# Patient Record
Sex: MALE | Race: BLACK OR AFRICAN AMERICAN | Employment: UNEMPLOYED | URBAN - METROPOLITAN AREA
[De-identification: names, ages, dates, MRNs, and addresses within clinical notes are randomized per-mention and may not be internally consistent; named-entity substitution may affect disease eponyms.]

---

## 2017-01-26 ENCOUNTER — OFFICE VISIT (OUTPATIENT)
Dept: ORTHOPEDIC SURGERY | Age: 62
End: 2017-01-26

## 2017-01-26 VITALS
BODY MASS INDEX: 28.99 KG/M2 | TEMPERATURE: 97.8 F | SYSTOLIC BLOOD PRESSURE: 131 MMHG | DIASTOLIC BLOOD PRESSURE: 76 MMHG | HEART RATE: 48 BPM | HEIGHT: 72 IN | WEIGHT: 214 LBS

## 2017-01-26 DIAGNOSIS — M76.72 TENDINITIS OF LEFT PERONEUS BREVIS TENDON: Primary | ICD-10-CM

## 2017-01-26 RX ORDER — CHOLECALCIFEROL (VITAMIN D3) 125 MCG
1 CAPSULE ORAL
COMMUNITY
End: 2022-09-02

## 2017-01-26 RX ORDER — MELOXICAM 15 MG/1
15 TABLET ORAL
Qty: 30 TAB | Refills: 1 | Status: SHIPPED | OUTPATIENT
Start: 2017-01-26 | End: 2022-03-14 | Stop reason: SINTOL

## 2017-01-26 NOTE — PATIENT INSTRUCTIONS
Please follow up as needed. You are advised to contact us if your condition worsens. Peroneal Tendon Strain: Rehab Exercises  Your Care Instructions  Here are some examples of typical rehabilitation exercises for your condition. Start each exercise slowly. Ease off the exercise if you start to have pain. Your doctor or physical therapist will tell you when you can start these exercises and which ones will work best for you. How to do the exercises  Calf wall stretch (back knee straight)    1. Stand facing a wall with your hands on the wall at about eye level. Put your affected leg about a step behind your other leg. 2. Keeping your back leg straight and your back heel on the floor, bend your front knee and gently bring your hip and chest toward the wall until you feel a stretch in the calf of your back leg. 3. Hold the stretch for at least 15 to 30 seconds. 4. Repeat 2 to 4 times. Calf wall stretch (knees bent)    1. Stand facing a wall with your hands on the wall at about eye level. Put your affected leg about a step behind your other leg. 2. Keeping both heels on the floor, bend both knees. Then gently bring your hip and chest toward the wall until you feel a stretch in the calf of your back leg. 3. Hold the stretch for at least 15 to 30 seconds. 4. Repeat 2 to 4 times. Hamstring wall stretch    1. Lie on your back in a doorway, with your good leg through the open door. 2. Slide your affected leg up the wall to straighten your knee. You should feel a gentle stretch down the back of your leg. ¨ Do not arch your back. ¨ Do not bend either knee. ¨ Keep one heel touching the floor and the other heel touching the wall. Do not point your toes. 3. Hold the stretch for at least 1 minute to begin. Then over time, try to lengthen the time you hold the stretch to as long as 6 minutes. 4. Repeat 2 to 4 times.   If you do not have a place to do this exercise in a doorway, there is another way to do it:  1. Lie on your back, and bend the knee of your affected leg. 2. Loop a towel under the ball and toes of that foot, and hold the ends of the towel in your hands. 3. Straighten your knee, and slowly pull back on the towel. You should feel a gentle stretch down the back of your leg. 4. Hold the stretch for 15 to 30 seconds. Or even better, hold the stretch for 1 minute if you can. 5. Repeat 2 to 4 times. Shin muscle stretch    1. Sit in a chair, with both feet flat on the floor. 2. Bend your affected leg behind you so that the top of your foot near your toes is flat on the floor and your toes are pointed away from your body. If you need to, you can hold on to the sides of the chair for support. 3. Hold the stretch for at least 15 to 30 seconds. You should feel a stretch in the front (shin) of your lower leg. 4. Repeat 2 to 4 times. Follow-up care is a key part of your treatment and safety. Be sure to make and go to all appointments, and call your doctor if you are having problems. It's also a good idea to know your test results and keep a list of the medicines you take. Where can you learn more? Go to http://amanda-brenda.info/. Enter [id number] in the search box to learn more about \"Peroneal Tendon Strain: Rehab Exercises. \"  Current as of: May 23, 2016  Content Version: 11.1  © 6276-8314 [employer]. Care instructions adapted under license by [employer] (which disclaims liability or warranty for this information). If you have questions about a medical condition or this instruction, always ask your healthcare professional. [address] any warranty or liability for your use of this information.

## 2017-01-26 NOTE — MR AVS SNAPSHOT
Visit Information Date & Time Provider Department Dept. Phone Encounter #  
 1/26/2017  2:50 PM Raudel Contreras, [address] [facility] [phone] Upcoming Health Maintenance Date Due Hepatitis C Screening 1955 DTaP/Tdap/Td series (1 - Tdap) 4/7/1978 FOBT Q 1 YEAR AGE 50-75 4/7/2007 INFLUENZA AGE 9 TO ADULT 8/1/2016 Allergies as of 1/26/2017  Review Complete On: 1/26/2017 By: Champ Padilla No Known Allergies Current Immunizations  Never Reviewed No immunizations on file. Not reviewed this visit You Were Diagnosed With   
  
 Codes Comments Tendinitis of left peroneus brevis tendon    -  Primary ICD-10-CM: M76.72 
ICD-9-CM: 727.06 Vitals BP Pulse Temp Height(growth percentile) Weight(growth percentile) BMI  
 131/76 (!) 48 97.8 °F (36.6 °C) (Oral) 6' (1.829 m) 214 lb (97.1 kg) 29.02 kg/m2 Smoking Status Never Smoker BMI and BSA Data Body Mass Index Body Surface Area  
 29.02 kg/m 2 2.22 m 2 Your Updated Medication List  
  
   
This list is accurate as of: 1/26/17  4:39 PM.  Always use your most recent med list.  
  
  
  
  
 ALEVE 220 mg Cap Generic drug:  naproxen sodium Take  by mouth. TYLENOL 325 mg tablet Generic drug:  acetaminophen Take  by mouth every four (4) hours as needed for Pain. Patient Instructions Please follow up as needed. You are advised to contact us if your condition worsens. Peroneal Tendon Strain: Rehab Exercises Your Care Instructions Here are some examples of typical rehabilitation exercises for your condition. Start each exercise slowly. Ease off the exercise if you start to have pain. Your doctor or physical therapist will tell you when you can start these exercises and which ones will work best for you. How to do the exercises Calf wall stretch (back knee straight) 1. Stand facing a wall with your hands on the wall at about eye level. Put your affected leg about a step behind your other leg. 2. Keeping your back leg straight and your back heel on the floor, bend your front knee and gently bring your hip and chest toward the wall until you feel a stretch in the calf of your back leg. 3. Hold the stretch for at least 15 to 30 seconds. 4. Repeat 2 to 4 times. Calf wall stretch (knees bent) 1. Stand facing a wall with your hands on the wall at about eye level. Put your affected leg about a step behind your other leg. 2. Keeping both heels on the floor, bend both knees. Then gently bring your hip and chest toward the wall until you feel a stretch in the calf of your back leg. 3. Hold the stretch for at least 15 to 30 seconds. 4. Repeat 2 to 4 times. Hamstring wall stretch 1. Lie on your back in a doorway, with your good leg through the open door. 2. Slide your affected leg up the wall to straighten your knee. You should feel a gentle stretch down the back of your leg. ¨ Do not arch your back. ¨ Do not bend either knee. ¨ Keep one heel touching the floor and the other heel touching the wall. Do not point your toes. 3. Hold the stretch for at least 1 minute to begin. Then over time, try to lengthen the time you hold the stretch to as long as 6 minutes. 4. Repeat 2 to 4 times. If you do not have a place to do this exercise in a doorway, there is another way to do it: 1. Lie on your back, and bend the knee of your affected leg. 2. Loop a towel under the ball and toes of that foot, and hold the ends of the towel in your hands. 3. Straighten your knee, and slowly pull back on the towel. You should feel a gentle stretch down the back of your leg. 4. Hold the stretch for 15 to 30 seconds. Or even better, hold the stretch for 1 minute if you can. 5. Repeat 2 to 4 times. Shin muscle stretch 1. Sit in a chair, with both feet flat on the floor. 2. Bend your affected leg behind you so that the top of your foot near your toes is flat on the floor and your toes are pointed away from your body. If you need to, you can hold on to the sides of the chair for support. 3. Hold the stretch for at least 15 to 30 seconds. You should feel a stretch in the front (shin) of your lower leg. 4. Repeat 2 to 4 times. Follow-up care is a key part of your treatment and safety. Be sure to make and go to all appointments, and call your doctor if you are having problems. It's also a good idea to know your test results and keep a list of the medicines you take. Where can you learn more? Go to http://amanda-brenda.info/. Enter [id number] in the search box to learn more about \"Peroneal Tendon Strain: Rehab Exercises. \" Current as of: May 23, 2016 Content Version: 11.1 © 9686-8716 [employer]. Care instructions adapted under license by [employer] (which disclaims liability or warranty for this information). If you have questions about a medical condition or this instruction, always ask your healthcare professional. [address] any warranty or liability for your use of this information. Introducing [facility]! Elyse Gabriel introduces [employer] patient portal. Now you can access parts of your medical record, email your doctor's office, and request medication refills online. 1. In your internet browser, go to [email or web address]. [email or web address] 2. Click on the First Time User? Click Here link in the Sign In box. You will see the New Member Sign Up page. 3. Enter your Gl[employer] Access Code exactly as it appears below. You will not need to use this code after youve completed the sign-up process. If you do not sign up before the expiration date, you must request a new code. · [employer] Access Code: [email or web address] Expires: 3/14/2017  1:08 PM 
 
 4. Enter the last four digits of your Social Security Number (xxxx) and Date of Birth (mm/dd/yyyy) as indicated and click Submit. You will be taken to the next sign-up page. 5. Create a [facility] ID. This will be your [facility] login ID and cannot be changed, so think of one that is secure and easy to remember. 6. Create a [employer] password. You can change your password at any time. 7. Enter your Password Reset Question and Answer. This can be used at a later time if you forget your password. 8. Enter your e-mail address. You will receive e-mail notification when new information is available in [address] 9. Click Sign Up. You can now view and download portions of your medical record. 10. Click the Download Summary menu link to download a portable copy of your medical information. If you have questions, please visit the Frequently Asked Questions section of the [employer] website. Remember, [facility] is NOT to be used for urgent needs. For medical emergencies, dial 911. Now available from your iPhone and Android! Please provide this summary of care documentation to your next provider. If you have any questions after today's visit, please call [phone].

## 2017-01-26 NOTE — PROGRESS NOTES
AMBULATORY PROGRESS NOTE      Patient: Vannessa Irving             MRN: [id number]     SSN: [id number] Body mass index is 29.02 kg/(m^2). YOB: 1955     AGE: 63 y.o. EX: male    PCP: No primary care provider on file. IMPRESSION/DIAGNOSIS AND TREATMENT PLAN     DIAGNOSES  1. Tendinitis of left peroneus brevis tendon        Orders Placed This Encounter    meloxicam (MOBIC) 15 mg tablet      Vannessa Irving understands his diagnoses and the proposed plan. Plan:    1) Continue HEP  2) Mobic 15 m PO tablet every day PRN; 30 tablets, 1 refill    RTO - PRN    HPI AND EXAMINATION     Vannessa Irving IS A 63 y.o. male who presents to my outpatient office for follow up of left peroneus brevis tendonitis. At last visit, I recommended HEP with Theraband, and I recommended the use of OTC NSAIDs when pain worsened. The patient presents to the office stating that he has improved. He states that he has been taking OTC Tylenol 300 mg when he has pain. He also reports he has been doing the HEP. Denies swelling, popping, and grinding during movement. Patient states that he works at [email or web address]. He states he works twelve hour shifts. Denies history of GI disorders. Denies taking blood thinners.     ANKLE/FOOT left     Psychiatry: Alert, Oriented x 3; Speech normal in context and clarity,    Memory intact grossly, no involuntary movements - tremors, no dementia  Gait: normal  Tenderness: no tenderness posterolateral aspect of ankle at the peroneal tendons  Cutaneous: WNL, no swelling to lateral aspect of ankle  Joint Motion: WNL  Joint / Tendon Stability: No Ankle or Subtalar instability or joint laxity. No peroneal sublux ability or dislocation  Alignment:  Normal Foot Alignment and  Flexible  Neuro Motor/Sensory: NL/NL  Vascular: NL foot/ankle pulses  Lymphatics: No extremity lymphedema, No calf swelling, no tenderness to calf muscles. CHART REVIEW     History reviewed.  No pertinent past medical history. Current Outpatient Prescriptions   Medication Sig    naproxen sodium (ALEVE) 220 mg cap Take  by mouth.  meloxicam (MOBIC) 15 mg tablet Take 1 Tab by mouth daily as needed for Pain.  acetaminophen (TYLENOL) 325 mg tablet Take  by mouth every four (4) hours as needed for Pain. No current facility-administered medications for this visit. No Known Allergies  History reviewed. No pertinent past surgical history. Social History     Occupational History    Not on file. Social History Main Topics    Smoking status: Never Smoker    Smokeless tobacco: Not on file    Alcohol use No    Drug use: No    Sexual activity: Not on file     History reviewed. No pertinent family history. REVIEW OF SYSTEMS : 1/26/2017  ALL BELOW ARE Negative except : SEE HPI       Constitutional: Negative for fever, chills and weight loss. Neg Weigh Loss  Cardiovascular: Negative for chest pain, claudication and leg swelling. SOB, MENESES   Gastrointestinal: Negative for  pain, N/V/D/C, Blood in stool or urine,dysuria, hematuria,        Incontinence, pelvic pain  Musculoskeletal: see HPI. Neurological: Negative for dizziness and weakness. Negative for headaches,Visual Changes, Confusion, Seizures,   Psychiatric/Behavioral: Negative for depression, memory loss and substance abuse. Extremities:  Negative for  hair changes, rash or skin lesion changes. Hematologic: Negative for Bleeding problems, bruising, pallor or swollen lymph nodes.   Peripheral Vascular: No calf pain, vascular vein tenderness to calf pain              No calf throbbing, posterior knee throbbing pain    DIAGNOSTIC IMAGING     No notes on file    Written by Shelley Elaine, as dictated by Monique Hassan MD.

## 2020-03-10 ENCOUNTER — OFFICE VISIT (OUTPATIENT)
Dept: ORTHOPEDIC SURGERY | Age: 65
End: 2020-03-10

## 2020-03-10 VITALS
TEMPERATURE: 97.4 F | RESPIRATION RATE: 16 BRPM | BODY MASS INDEX: 30.85 KG/M2 | HEIGHT: 72 IN | OXYGEN SATURATION: 96 % | SYSTOLIC BLOOD PRESSURE: 158 MMHG | WEIGHT: 227.8 LBS | DIASTOLIC BLOOD PRESSURE: 95 MMHG | HEART RATE: 51 BPM

## 2020-03-10 DIAGNOSIS — G89.29 CHRONIC PRIMARY MUSCULOSKELETAL PAIN: Primary | ICD-10-CM

## 2020-03-10 DIAGNOSIS — M54.59 MECHANICAL LOW BACK PAIN: ICD-10-CM

## 2020-03-10 DIAGNOSIS — M79.18 MYOFASCIAL PAIN: ICD-10-CM

## 2020-03-10 DIAGNOSIS — M54.50 LUMBAR SPINE PAIN: ICD-10-CM

## 2020-03-10 DIAGNOSIS — M79.18 CHRONIC PRIMARY MUSCULOSKELETAL PAIN: Primary | ICD-10-CM

## 2020-03-10 RX ORDER — AMITRIPTYLINE HYDROCHLORIDE 25 MG/1
25 TABLET, FILM COATED ORAL
COMMUNITY
Start: 2020-02-11

## 2020-03-10 RX ORDER — OMEPRAZOLE 10 MG/1
10 CAPSULE, DELAYED RELEASE ORAL DAILY
COMMUNITY

## 2020-03-10 RX ORDER — BACLOFEN 10 MG/1
TABLET ORAL 3 TIMES DAILY
COMMUNITY
Start: 2020-01-02 | End: 2022-03-14 | Stop reason: SINTOL

## 2020-03-10 RX ORDER — GABAPENTIN 300 MG/1
300 CAPSULE ORAL 3 TIMES DAILY
COMMUNITY
Start: 2020-01-02 | End: 2022-01-06 | Stop reason: DRUGHIGH

## 2020-03-10 NOTE — PROGRESS NOTES
[facility]  Office visit  Yuni Mclain  1934  [phone]    VISIT DATE:  12/16/2019      PCP: Markell Cho MD  [address] [place], [place] [id number]    CC:  Chief Complaint   Patient presents with   • Atrial contractions, premature   • Hypertension       PROBLEM LIST:  1. Premature atrial contractions and premature ventricular contractions:  a. Recent Zio monitoring confirming PACs, no arrhythmias, no pauses.  2. Hypercholesterolemia.  3. Benign hypertension.  4. Chest pain:  a. Mild to moderate coronary artery disease by catheterization, 2003, medically managed.  b. Recurrent episode since November using nitroglycerin x1.  5. Mild mitral regurgitation by echocardiography.      Previous cardiac studies and procedures:  July 2019 echo  · Estimated EF appears to be in the range of 66 - 70%.  · Left ventricular wall thickness is consistent with hypertrophy. Sigmoid-shaped ventricular septum is present.  · Left ventricular diastolic dysfunction (grade I a) consistent with impaired relaxation.    ASSESSMENT:   Diagnosis Plan   1. Atrial contractions, premature     2. Hypercholesterolemia     3. Benign hypertension         PLAN:  -Premature atrial contractions: Continue flecainide 50 mg by mouth twice a day    -Hypertension: Goal less than 140/90 mmHg, ideally less than 130/80 mmHg.  Currently with reasonable control.  Continue current medical therapy.    -Dyslipidemia: Continue niacin 500 mg by mouth daily.  Recent LDL of 160, goal less than 100, previously intolerant to multiple statins.  Continue dietary modifications in the setting of primary prevention.    Subjective  History of whitecoat hypertension.  Blood pressures predominantly running less than 140/90 mmHg.  She is compliant with medical therapy.  Intermittent but rare brief palpitations which she can sense at the base of her neck.  No associated symptoms.  No known triggers.   Compliant with medical therapy.   [address]  [place], [facility] [address]  Phone: [phone]  Fax: [phone]        [employer]  : 1955  PCP: Syeda Hope, NP  3/10/2020    NEW PATIENT      HISTORY OF PRESENT ILLNESS  Emerald Schafer is a 60 y.o. male c/o chronic back pain (R>L) since a work injury in 2018 with episodic pain radiating into the RLE. He was under the care of Dr. Zack Ferraro during this time. He is no longer covered under  for this injury. Pt notes that he last attended PT a few months ago with temporary relief. Pt reports difficulty getting out of bed, walking, or bending over to pick something up off the floor. He was seen by Sports Medicine who received injections and RFA in the cervical region that provided some relief. Pt notes that he also has a sore on his back from popping a blackhead. I advised he consult his PCP for this. Pt notes that he is trying to obtain disability as his pain is functionally disabling. He has been taking Gabapentin and muscle relaxants without significant benefit. Cervical spine MRI dated 3/14/19 reviewed. Per report, Minimal C4-5, C5-6, C6-7 central stenosis from minimal to mild disc bulging and spondylosis. Ventral cord contact with minimal flattening deformity, C3-4, C4-5, C5-C6 but no abnormal cord signal. Mid cervical straightening contributing to C3-4 level cord contact. Multilevel and bilateral foraminal stenosis, greatest and moderate to severe bilaterally at C4-5, C5-6, left C6-7 with impingement on bilateral C5, C6 and left C7 exiting nerve roots. Thoracic spine MRI dated 3/14/19 reviewed. Per report, Unremarkable MR thoracic spine. Incidental nonspinal findings as above. Lumbar spine MRI dated 10/26/18 reviewed. Per report, Multilevel spondylosis and foraminal narrowing without limiting central stenosis. L5/S1 and slight L3/4 annular tear which can be a pain generator.  Feathery hyperintensity which "Reviewed results of recent transthoracic echo    PHYSICAL EXAMINATION:  Vitals:    12/16/19 1317 12/16/19 1318   BP: 130/62 140/66   BP Location: Left arm Right arm   Patient Position: Sitting Sitting   Pulse: 66    Weight: 71.2 kg (157 lb)    Height: 167.6 cm (66\")      General Appearance:    Alert, cooperative, no distress, appears stated age   Head:    Normocephalic, without obvious abnormality, atraumatic   Eyes:    conjunctiva/corneas clear   Nose:   Nares normal, septum midline, mucosa normal, no drainage   Throat:   Lips, teeth and gums normal   Neck:   Supple, symmetrical, trachea midline, no carotid    bruit or JVD   Lungs:     Clear to auscultation bilaterally, respirations unlabored   Chest Wall:    No tenderness or deformity, I do not appreciate a bruit over the right upper chest.    Heart:    Regular rate and rhythm, S1 and S2 normal, 2/6 early peaking systolic murmur right upper sternal border, rub   or gallop, normal carotid impulse bilaterally without bruit.   Abdomen:     Soft, non-tender   Extremities:   Extremities normal, atraumatic, no cyanosis or edema   Pulses:   2+ and symmetric all extremities   Skin:   Skin color, texture, turgor normal, no rashes or lesions       Diagnostic Data:  Procedures  No results found for: CHLPL, TRIG, HDL, LDLDIRECT  Lab Results   Component Value Date    GLUCOSE 93 01/05/2017    BUN 10 01/05/2017    CREATININE 0.70 01/05/2017     01/05/2017    K 3.9 01/05/2017     01/05/2017    CO2 27.0 01/05/2017     Lab Results   Component Value Date    HGBA1C 4.8 07/05/2015     Lab Results   Component Value Date    WBC 6.78 01/05/2017    HGB 12.9 01/05/2017    HCT 38.6 01/05/2017     01/05/2017       Allergies  Allergies   Allergen Reactions   • Albuterol      Unknown reactions   • Statins Myalgia   • Demerol [Meperidine] Itching   • Sulfa Antibiotics Itching       Current Medications    Current Outpatient Medications:   •  amLODIPine (NORVASC) 5 MG tablet, " can reflect edema/myositis in the paraspinal back musculature. Heterogeneously hyperintense signal in distal cord (T11 level), at the edge of the field-of-view of imaging, typically artifactual and not confirmed on sagittal T1-weighted imaging. If clinical findings are suspicious for thoracic cord findings, then dedicated thoracic spine MRI could best further assess. He rates his pain as an 8/10 today. ASSESSMENT  His symptoms are likely due to chronic primary musculoskeletal pain. He had tenderness to palpation of the lumbar paraspinals (R>L), QL, and flank. He recently tried PT without success. He is neurologically intact. We discussed options of: updated MRI, pain management, PT    PLAN  1. Referral to pain management  2. Lumbar MRI to reevaluate annular tear, lumbar paraspinal edema. Pt will apply for [employer] Coverage. 3. We may consider a referral to PT depending on the results of his MRI. Pt will f/u after MRI or sooner if needed. Diagnoses and all orders for this visit:    1. Chronic primary musculoskeletal pain  -     REFERRAL TO PAIN MANAGEMENT  -     MRI LUMB SPINE WO CONT; Future    2. Lumbar spine pain  -     AMB POC XRAY, SPINE, LUMBOSACRAL; 2 O  -     REFERRAL TO PAIN MANAGEMENT  -     MRI LUMB SPINE WO CONT; Future    3. Mechanical low back pain  -     REFERRAL TO PAIN MANAGEMENT  -     MRI LUMB SPINE WO CONT; Future    4. Myofascial pain  -     REFERRAL TO PAIN MANAGEMENT  -     MRI LUMB SPINE WO CONT; Future       CHIEF COMPLAINT  Jennifer Julien is seen today in consultation at the request of Sandra Mahan NP for complaints of chronic low back pain. PAST MEDICAL HISTORY   No past medical history on file. History reviewed. No pertinent surgical history. MEDICATIONS    Current Outpatient Medications   Medication Sig Dispense Refill    naproxen sodium (ALEVE) 220 mg cap Take  by mouth.       meloxicam (MOBIC) 15 mg tablet Take 1 Tab by mouth daily as needed for Pain. 30 Tab 1    acetaminophen (TYLENOL) 325 mg tablet Take  by mouth every four (4) hours as needed for Pain. ALLERGIES  No Known Allergies       SOCIAL HISTORY    Social History     Socioeconomic History    Marital status:      Spouse name: Not on file    Number of children: Not on file    Years of education: Not on file    Highest education level: Not on file   Tobacco Use    Smoking status: Never Smoker   Substance and Sexual Activity    Alcohol use: No    Drug use: No       FAMILY HISTORY  No family history on file. REVIEW OF SYSTEMS  Review of Systems   Musculoskeletal: Positive for back pain. PHYSICAL EXAMINATION  Visit Vitals  BP (!) 158/95 (BP 1 Location: Right arm, BP Patient Position: Sitting)   Pulse (!) 51   Temp 97.4 °F (36.3 °C) (Oral)   Resp 16   Ht 6' (1.829 m)   Wt 227 lb 12.8 oz (103.3 kg)   SpO2 96%   BMI 30.90 kg/m²         Pain Assessment  3/10/2020   Location of Pain Back;Leg   Location Modifiers Inferior   Severity of Pain 8   Quality of Pain Aching   Quality of Pain Comment \"numbness,tingling,weakness\"   Duration of Pain Persistent   Frequency of Pain Constant   Aggravating Factors -   Limiting Behavior -   Relieving Factors -   Result of Injury No         Constitutional:  Well developed, well nourished, in no acute distress. Psychiatric: Affect and mood are appropriate. HEENT: Normocephalic, atraumatic. Extraocular movements intact. Integumentary: No rashes or abrasions noted on exposed areas. Cardiovascular: Regular rate and rhythm. Pulmonary: Clear to auscultation bilaterally. SPINE/MUSCULOSKELETAL EXAM    Cervical spine:  Neck is midline. Normal muscle tone. No focal atrophy is noted. ROM pain free. Shoulder ROM intact. No tenderness to palpation. Negative Spurling's sign. Negative Tinel's sign. Negative Russo's sign. Sensation in the bilateral arms grossly intact to light touch.      Lumbar spine:  No rash, Take 1 tablet by mouth Daily., Disp: 90 tablet, Rfl: 1  •  aspirin 81 MG EC tablet, Take 81 mg by mouth Daily., Disp: , Rfl:   •  CBD (cannabidiol) oral oil, Take 2 drops by mouth 4 (Four) Times a Day. 2 drops morning  2 drops nightly, Disp: , Rfl:   •  cholecalciferol (VITAMIN D3) 1000 units tablet, Take 5,000 Units by mouth Daily., Disp: , Rfl:   •  Famotidine (PEPCID PO), Take 25 mg by mouth As Needed., Disp: , Rfl:   •  flecainide (TAMBOCOR) 50 MG tablet, Take 1 tablet by mouth Every 12 (Twelve) Hours., Disp: 180 tablet, Rfl: 3  •  folic acid (FOLVITE) 400 MCG tablet, Take 400 mcg by mouth Daily., Disp: , Rfl:   •  hydrochlorothiazide (HYDRODIURIL) 25 MG tablet, Take 1 tablet by mouth Daily., Disp: 90 tablet, Rfl: 3  •  Ibuprofen (IBU PO), Take  by mouth As Needed., Disp: , Rfl:   •  niacin 500 MG tablet, Take 1 tablet by mouth Every Night., Disp: 90 tablet, Rfl: 3          ROS  Review of Systems   Cardiovascular: Positive for irregular heartbeat and palpitations. Negative for chest pain and dyspnea on exertion.   Respiratory: Positive for snoring. Negative for cough.    Genitourinary: Positive for frequency.       SOCIAL HX  Social History     Socioeconomic History   • Marital status:      Spouse name: Not on file   • Number of children: Not on file   • Years of education: Not on file   • Highest education level: Not on file   Tobacco Use   • Smoking status: Never Smoker   • Smokeless tobacco: Never Used   Substance and Sexual Activity   • Alcohol use: Yes     Comment: occasionally   • Drug use: No   • Sexual activity: Defer       FAMILY HX  Family History   Problem Relation Age of Onset   • Heart disease Mother    • Cancer Father    • Arthritis Father              Lewis Echols III, MD, FACC       ecchymosis, or gross obliquity. No fasciculations. No focal atrophy is noted. No pain with hip ROM. Full range of motion. Tenderness to palpation of lumbar paraspinals. No tenderness to palpation at the sciatic notch. SI joints non-tender. Trochanters non tender. Sensation in the bilateral legs grossly intact to light touch. Back pain reproduced with stabilization. MOTOR:      Biceps  Triceps Deltoids Wrist Ext Wrist Flex Hand Intrin   Right 5/5 5/5 5/5 5/5 5/5 5/5   Left 5/5 5/5 5/5 5/5 5/5 5/5             Hip Flex  Quads Hamstrings Ankle DF EHL Ankle PF   Right 5/5 5/5 5/5 5/5 5/5 5/5   Left 5/5 5/5 5/5 5/5 5/5 5/5     DTRs are 2+ biceps, triceps, brachioradialis, patella, and Achilles. Negative Straight Leg raise. Squat not tested. No difficulty with tandem gait. Ambulation without assistive device. FWB. RADIOGRAPHS  Lumbar XR images taken on 3/10/2020 personally reviewed with patient:  Spondylitic changes  Significant lean to the R  Disc space narrowing at L4-5 and L5-S1  Facet sclerosis  Potential Baastrup's disease  Normal alignment  No obvious compression deformities    Cervical MRI images taken on 3/14/19 personally reviewed with patient:  Mild mid cervical straightening with loss of upper cervical lordosis but no listhesis    Postop:   None     At C2-3:  Unremarkable disc and facet joints. At C3-4:  Minimal loss of disc height with mild disc bulge and minimal spondylosis. Low normal canal caliber; no central stenosis. Contact and minimal flattening of the ventral cord due to alignment versus cord difference but no abnormal cord signal. Unremarkable facet joints. No foraminal stenosis      At C4-5:  Mild loss of disc height with mild disc bulge and minimal spondylosis. Minimal central stenosis. Contact and minimal flattening of the ventral cord in part due to alignment versus cord difference but no abnormal cord signal. Unremarkable facet joints.  Severe left and moderate right foraminal stenosis from uncovertebral bony hypertrophy with impingement on both exiting C5 nerve roots. At C5-6:  Mild loss of disc height with mild disc bulge and minimal spondylosis. Minimal central stenosis. Contact and minimal flattening of the ventral cord in part due to alignment versus cord difference but no abnormal cord signal. Unremarkable facet joints. Severe left and right foraminal stenosis from uncovertebral bony hypertrophy with impingement on both exiting C6 nerve roots. At C6-7:  Mild loss of disc height with mild minimally asymmetric left greater than right paracentral disc bulge and minimal spondylosis. Minimal central stenosis. Contact and minimal flattening of the ventral cord in part due to alignment versus cord difference but no abnormal cord signal. Unremarkable facet joints. Severe left and mild right foraminal stenosis from uncovertebral bony hypertrophy with impingement on left exiting C7 nerve root. At C7-T1:  Unremarkable disc and facet joints. Unremarkable upper thoracic discs as included    CERVICAL CORD:  Cervical cord is of otherwise normal caliber and signal.     MISC:  Marrow signal is primarily red marrow, unremarkable for age and gender. Craniocervical junction is within normal limits. Atlantoaxial joint is minimally osteoarthritic    Paraspinal musculature is unremarkable in caliber and signal.     L5-S1 spondylosis on  localizer,      IMPRESSION    1. Minimal C4-5, C5-6, C6-7 central stenosis from minimal to mild disc bulging and spondylosis. Ventral cord contact with minimal flattening deformity, C3-4, C4-5, C5-C6 but no abnormal cord signal. Mid cervical straightening contributing to C3-4 level cord contact. 2. Multilevel and bilateral foraminal stenosis, greatest and moderate to severe bilaterally at C4-5, C5-6, left C6-7 with impingement on bilateral C5, C6 and left C7 exiting nerve roots.     Thoracic MRI images taken on 3/14/19 personally reviewed with patient:  Alignment is anatomic. DISC LEVELS:  Unremarkable discs and facet joints. Minimal T11-12 foraminal stenosis from facet arthritis. THORACIC CORD:  Thoracic cord is of normal caliber and signal. Top normal caliber epidural fat but no canal stenosis    MISC:  Focal round 1 cm hemangioma, T2 vertebral body Marrow signal is otherwise unremarkable for age and gender. Paraspinal musculature is unremarkable in caliber and signal.     Probable small 2 cm round fluid signal lesion in the right lobe liver and 1 cm round fluid signal lesion in the right kidney, both incompletely included visible on only 2 images, indeterminate but most likely benign most likely small cysts, possibly hepatic hemangioma and renal cyst. Curvilinear intermediate signal both lungs, indeterminate also most likely partial atelectasis or scarring. IMPRESSION    1. Unremarkable MR thoracic spine    2. Incidental nonspinal findings as above. Lumbar MRI images taken on 10/26/18 personally reviewed with patient:  The conus terminates at the L1 level, without contour abnormalities. There is heterogeneous hyperintense signal in the distal cord, on T2 and STIR imaging, not confirmed on T1. Axial imaging is not obtained through this level. Marrow/osseous: No T1 or STIR signal abnormalities to suggest acute facet, marrow, edema or focal lesion. T12/L1: No significant disc protrusion or limiting stenosis is seen. L1/2: No significant disc protrusion or limiting stenosis is seen. L2/3: Mild disc protrusion, mild ventral impression on thecal sac without limiting central or foraminal stenosis. Mild facet arthropathy. L3/4: Mild disc protrusion, mild ventral impression on thecal sac, facet and ligamentous hypertrophy mildly flattening the dorsal lateral thecal sac. Slight T2 hyperintensity suggests annular tear. Mild to moderate left more than right inferior foraminal narrowing.  Borderline central stenosis. L4/5: Disc space narrowing, mild to moderate inferior foraminal narrowing secondary to disc/osteophyte, facet and ligamentous hypertrophy. Mild dorsolateral impression on the thecal sac. No limiting central stenosis. L5/S1: Central and foraminal disc/osteophyte, annular tear, mild ventral contact of the thecal sac and descending S1 roots, and right more than left inferior foraminal narrowing, slightly contact the undersurface of the foraminal right L5 nerve root. Facet and ligamentous hypertrophy. Other: Feathery hyperintensity in the posterior paraspinal back musculature. IMPRESSION      1. Multilevel spondylosis and foraminal narrowing without limiting central stenosis. L5/S1 and slight L3/4 annular tear which can be a pain generator. 2. Feathery hyperintensity which can reflect edema/myositis in the paraspinal back musculature. 3. Heterogeneously hyperintense signal in distal cord (T11 level), at the edge of the field-of-view of imaging, typically artifactual and not confirmed on sagittal T1-weighted imaging. If clinical findings are suspicious for thoracic cord findings, then dedicated thoracic spine MRI could best further assess.  reviewed    Mr. Geovanna Armstrong has a reminder for a \"due or due soon\" health maintenance. I have asked that he contact his primary care provider for follow-up on this health maintenance. 45 minutes of face-to-face contact were spent with the patient during today's visit extensively discussing symptoms and treatment plan. All questions were answered. More than half of this visit today was spent on counseling. Written by Faith Cabrera, as dictated by Dr. Homero Molina. I, Dr. Homero Molina, confirm that all documentation is accurate.

## 2021-08-26 ENCOUNTER — OFFICE VISIT (OUTPATIENT)
Dept: ORTHOPEDIC SURGERY | Age: 66
End: 2021-08-26
Payer: MEDICARE

## 2021-08-26 VITALS
BODY MASS INDEX: 29.58 KG/M2 | WEIGHT: 218.4 LBS | TEMPERATURE: 97.4 F | OXYGEN SATURATION: 97 % | HEIGHT: 72 IN | RESPIRATION RATE: 18 BRPM | HEART RATE: 60 BPM

## 2021-08-26 DIAGNOSIS — M54.16 LUMBAR RADICULOPATHY: ICD-10-CM

## 2021-08-26 DIAGNOSIS — M79.18 CHRONIC PRIMARY MUSCULOSKELETAL PAIN: Primary | ICD-10-CM

## 2021-08-26 DIAGNOSIS — M54.59 MECHANICAL LOW BACK PAIN: ICD-10-CM

## 2021-08-26 DIAGNOSIS — M79.18 MYOFASCIAL PAIN: ICD-10-CM

## 2021-08-26 DIAGNOSIS — G89.29 CHRONIC PRIMARY MUSCULOSKELETAL PAIN: Primary | ICD-10-CM

## 2021-08-26 PROCEDURE — 99213 OFFICE O/P EST LOW 20 MIN: CPT | Performed by: PHYSICAL MEDICINE & REHABILITATION

## 2021-08-26 RX ORDER — PREDNISONE 10 MG/1
TABLET ORAL
Qty: 21 TABLET | Refills: 0 | Status: SHIPPED | OUTPATIENT
Start: 2021-08-26 | End: 2022-03-14 | Stop reason: ALTCHOICE

## 2021-08-26 NOTE — PROGRESS NOTES
[address], [address]  [place]  Phone: [phone]  Fax: [phone]        Gregorio Sorto  : 1955  PCP: Tito Aparicio MD  2021    PROGRESS NOTE      HISTORY OF PRESENT ILLNESS  Marleen Richard is a 61 y.o. male who was seen as a new patient 3/10/2020 with c/o chronic back pain (R>L) since a work injury in 2018 with episodic pain radiating into the RLE. He was under the care of Dr. Mick Ontiveros during this time. He is no longer covered under  for this injury. Pt notes that he last attended PT a few months ago with temporary relief. Pt reports difficulty getting out of bed, walking, or bending over to pick something up off the floor. He was seen by Sports Medicine who received injections and RFA in the cervical region that provided some relief. Pt notes that he also has a sore on his back from popping a blackhead. I advised he consult his PCP for this. Pt notes that he is trying to obtain disability as his pain is functionally disabling. He has been taking Gabapentin and muscle relaxants without significant benefit. Cervical spine MRI dated 3/14/19 reviewed. Per report, Minimal C4-5, C5-6, C6-7 central stenosis from minimal to mild disc bulging and spondylosis. Ventral cord contact with minimal flattening deformity, C3-4, C4-5, C5-C6 but no abnormal cord signal. Mid cervical straightening contributing to C3-4 level cord contact. Multilevel and bilateral foraminal stenosis, greatest and moderate to severe bilaterally at C4-5, C5-6, left C6-7 with impingement on bilateral C5, C6 and left C7 exiting nerve roots. Thoracic spine MRI dated 3/14/19 reviewed. Per report, Unremarkable MR thoracic spine. Incidental nonspinal findings as above. Lumbar spine MRI dated 10/26/18 reviewed. Per report, Multilevel spondylosis and foraminal narrowing without limiting central stenosis.  L5/S1 and slight L3/4 annular tear which can be a pain generator. Feathery hyperintensity which can reflect edema/myositis in the paraspinal back musculature. Heterogeneously hyperintense signal in distal cord (T11 level), at the edge of the field-of-view of imaging, typically artifactual and not confirmed on sagittal T1-weighted imaging. If clinical findings are suspicious for thoracic cord findings, then dedicated thoracic spine MRI could best further assess. Jerod Price comes in to the office today for f/u. Pt notes that he continues to have low back pain radiating into the posterior LLE to the calf that is worse with sitting, especially riding in the car. He has pain with transitional movements. He has not had any treatment since his last OV because he just received insurance coverage. He has been maintaining a consistent HEP. He occasionally takes Ibuprofen. He has been taking Baclofen, but his wife notes it seems to increase his blood pressure. Pain Score: 7/10. Treatments patient has tried:  Physical therapy:No  Doing HEP: Yes  Non-opioid medications: Yes- Ibuprofen (PRN), baclofen  Spinal injections: No  Spinal surgery- No.   Last Lumbar MRI: 2018    PmHx: acid reflux    ASSESSMENT  Jerod Price is a 61year-old male with chronic low back pain radiating into the LLE. His symptoms are likely due to myofascial pain and a potential left-sided radiculopathy. He is neurologically intact. PLAN  1. 10 mg Prednisone dose pack - he should consult with his dentist first (potential dental infection)  2. Referral to PT [facility])  3. I advised he can take Tylenol with his Ibuprofen. Pt will f/u in 6-8 weeks or sooner as needed. Diagnoses and all orders for this visit:    1. Chronic primary musculoskeletal pain  -     predniSONE (STERAPRED DS) 10 mg dose pack; See administration instruction per 10mg dose pack  -     REFERRAL TO PHYSICAL THERAPY    2. Mechanical low back pain  -     predniSONE (STERAPRED DS) 10 mg dose pack;  See administration instruction per 10mg dose pack  -     REFERRAL TO PHYSICAL THERAPY    3. Myofascial pain  -     predniSONE (STERAPRED DS) 10 mg dose pack; See administration instruction per 10mg dose pack  -     REFERRAL TO PHYSICAL THERAPY    4. Lumbar radiculopathy  -     predniSONE (STERAPRED DS) 10 mg dose pack; See administration instruction per 10mg dose pack  -     REFERRAL TO PHYSICAL THERAPY         PAST MEDICAL HISTORY   History reviewed. No pertinent past medical history. History reviewed. No pertinent surgical history. Leeanne Nguyen MEDICATIONS      Current Outpatient Medications   Medication Sig Dispense Refill    amitriptyline (ELAVIL) 25 mg tablet nightly.  gabapentin (NEURONTIN) 300 mg capsule 300 mg three (3) times daily.  baclofen (LIORESAL) 10 mg tablet three (3) times daily.  omeprazole (PRILOSEC) 10 mg capsule Take 10 mg by mouth daily.  acetaminophen (TYLENOL) 325 mg tablet Take 800 mg by mouth three (3) times daily.  naproxen sodium (ALEVE) 220 mg cap Take  by mouth. (Patient not taking: Reported on 8/26/2021)      meloxicam (MOBIC) 15 mg tablet Take 1 Tab by mouth daily as needed for Pain. (Patient not taking: Reported on 3/10/2020) 30 Tab 1        ALLERGIES  No Known Allergies       SOCIAL HISTORY    Social History     Socioeconomic History    Marital status:      Spouse name: Not on file    Number of children: Not on file    Years of education: Not on file    Highest education level: Not on file   Tobacco Use    Smoking status: Never Smoker    Smokeless tobacco: Never Used   Substance and Sexual Activity    Alcohol use: No    Drug use: No     Social Determinants of Health     Financial Resource Strain:     Difficulty of Paying Living Expenses:    Food Insecurity:     Worried About Running Out of Food in the Last Year:     [address] in the Last Year:    Transportation Needs:     Lack of Transportation (Medical):      Lack of Transportation (Non-Medical):    Physical Activity:     Days of Exercise per Week:     Minutes of Exercise per Session:    Stress:     Feeling of Stress :    Social Connections:     Frequency of Communication with Friends and Family:     Frequency of Social Gatherings with Friends and Family:     Attends Gnosticism Services:     Active Member of Clubs or Organizations:     Attends Club or Organization Meetings:     Marital Status:        FAMILY HISTORY  No family history on file. REVIEW OF SYSTEMS  Review of Systems   Constitutional: Negative for chills, fever and weight loss. Respiratory: Negative for shortness of breath. Cardiovascular: Negative for chest pain. Gastrointestinal: Negative for constipation. Negative for fecal incontinence    Genitourinary: Negative for dysuria. Negative for urinary incontinence   Musculoskeletal: Positive for back pain. LLE pain   Skin: Negative for rash. Neurological: Negative for dizziness, tingling, tremors, focal weakness and headaches. Endo/Heme/Allergies: Does not bruise/bleed easily. Psychiatric/Behavioral: The patient does not have insomnia. PHYSICAL EXAMINATION  Visit Vitals  Pulse 60   Temp 97.4 °F (36.3 °C) (Temporal)   Resp 18   Ht 6' (1.829 m)   Wt 218 lb 6.4 oz (99.1 kg)   SpO2 97%   BMI 29.62 kg/m²       Pain Assessment  8/26/2021   Location of Pain Back;Leg   Location Modifiers Left;Right   Severity of Pain 7   Quality of Pain Other (Comment); Sharp   Quality of Pain Comment -   Duration of Pain Persistent   Frequency of Pain Constant   Aggravating Factors Walking   Limiting Behavior Some   Relieving Factors Rest   Result of Injury -           Constitutional:  Well developed, well nourished, in no acute distress. Psychiatric: Affect and mood are appropriate. Integumentary: No rashes or abrasions noted on exposed areas. SPINE/MUSCULOSKELETAL EXAM    Cervical spine:  Neck is midline. Normal muscle tone.    No focal atrophy is noted. ROM pain free. Shoulder ROM intact. No tenderness to palpation. Negative Spurling's sign. Negative Tinel's sign. Negative Russo's sign. Sensation in the bilateral arms grossly intact to light touch.      Lumbar spine:  No rash, ecchymosis, or gross obliquity. No fasciculations. No focal atrophy is noted. No pain with hip ROM. Full range of motion. Tenderness to palpation of lumbar paraspinals. No tenderness to palpation at the sciatic notch. SI joints non-tender. Trochanters non tender. Sensation in the bilateral legs grossly intact to light touch.     Back pain reproduced with stabilization. Updates 8/26/21:  Negative SLR bilaterally  Intact sensation  Pain with transitional movements  Ambulation with walking stick  Tenderness to palpation lumbar paraspinals      MOTOR:      Biceps  Triceps Deltoids Wrist Ext Wrist Flex Hand Intrin   Right 5/5 5/5 5/5 5/5 5/5 5/5   Left 5/5 5/5 5/5 5/5 5/5 5/5             Hip Flex  Quads Hamstrings Ankle DF EHL Ankle PF   Right 5/5 5/5 5/5 5/5 5/5 5/5   Left 5/5 5/5 5/5 5/5 5/5 5/5     DTRs are 2+ biceps, triceps, brachioradialis, patella, and Achilles.     Negative Straight Leg raise. Squat not tested. No difficulty with tandem gait.      Ambulation without assistive device. FWB.       RADIOGRAPHS  Lumbar XR images taken on 3/10/2020 personally reviewed with patient:  Spondylitic changes  Significant lean to the R  Disc space narrowing at L4-5 and L5-S1  Facet sclerosis  Potential Baastrup's disease  Normal alignment  No obvious compression deformities    Cervical MRI images taken on 3/14/19 personally reviewed with patient:  Mild mid cervical straightening with loss of upper cervical lordosis but no listhesis    Postop:   None     At C2-3:  Unremarkable disc and facet joints.       At C3-4:  Minimal loss of disc height with mild disc bulge and minimal spondylosis. Low normal canal caliber; no central stenosis. Contact and minimal flattening of the ventral cord due to alignment versus cord difference but no abnormal cord signal. Unremarkable facet joints. No foraminal stenosis      At C4-5:  Mild loss of disc height with mild disc bulge and minimal spondylosis. Minimal central stenosis. Contact and minimal flattening of the ventral cord in part due to alignment versus cord difference but no abnormal cord signal. Unremarkable facet joints. Severe left and moderate right foraminal stenosis from uncovertebral bony hypertrophy with impingement on both exiting C5 nerve roots. At C5-6:  Mild loss of disc height with mild disc bulge and minimal spondylosis. Minimal central stenosis. Contact and minimal flattening of the ventral cord in part due to alignment versus cord difference but no abnormal cord signal. Unremarkable facet joints. Severe left and right foraminal stenosis from uncovertebral bony hypertrophy with impingement on both exiting C6 nerve roots. At C6-7:  Mild loss of disc height with mild minimally asymmetric left greater than right paracentral disc bulge and minimal spondylosis. Minimal central stenosis. Contact and minimal flattening of the ventral cord in part due to alignment versus cord difference but no abnormal cord signal. Unremarkable facet joints. Severe left and mild right foraminal stenosis from uncovertebral bony hypertrophy with impingement on left exiting C7 nerve root. At C7-T1:  Unremarkable disc and facet joints. Unremarkable upper thoracic discs as included    CERVICAL CORD:  Cervical cord is of otherwise normal caliber and signal.     MISC:  Marrow signal is primarily red marrow, unremarkable for age and gender. Craniocervical junction is within normal limits.      Atlantoaxial joint is minimally osteoarthritic    Paraspinal musculature is unremarkable in caliber and signal. L5-S1 spondylosis on  localizer,      IMPRESSION    1. Minimal C4-5, C5-6, C6-7 central stenosis from minimal to mild disc bulging and spondylosis. Ventral cord contact with minimal flattening deformity, C3-4, C4-5, C5-C6 but no abnormal cord signal. Mid cervical straightening contributing to C3-4 level cord contact. 2. Multilevel and bilateral foraminal stenosis, greatest and moderate to severe bilaterally at C4-5, C5-6, left C6-7 with impingement on bilateral C5, C6 and left C7 exiting nerve roots.     Thoracic MRI images taken on 3/14/19 personally reviewed with patient:  Alignment is anatomic. DISC LEVELS:  Unremarkable discs and facet joints. Minimal T11-12 foraminal stenosis from facet arthritis. THORACIC CORD:  Thoracic cord is of normal caliber and signal. Top normal caliber epidural fat but no canal stenosis    MISC:  Focal round 1 cm hemangioma, T2 vertebral body Marrow signal is otherwise unremarkable for age and gender. Paraspinal musculature is unremarkable in caliber and signal.     Probable small 2 cm round fluid signal lesion in the right lobe liver and 1 cm round fluid signal lesion in the right kidney, both incompletely included visible on only 2 images, indeterminate but most likely benign most likely small cysts, possibly hepatic hemangioma and renal cyst. Curvilinear intermediate signal both lungs, indeterminate also most likely partial atelectasis or scarring. IMPRESSION    1. Unremarkable MR thoracic spine    2. Incidental nonspinal findings as above.     Lumbar MRI images taken on 10/26/18 personally reviewed with patient:  The conus terminates at the L1 level, without contour abnormalities. There is heterogeneous hyperintense signal in the distal cord, on T2 and STIR imaging, not confirmed on T1. Axial imaging is not obtained through this level. Marrow/osseous: No T1 or STIR signal abnormalities to suggest acute facet, marrow, edema or focal lesion. T12/L1: No significant disc protrusion or limiting stenosis is seen. L1/2: No significant disc protrusion or limiting stenosis is seen. L2/3: Mild disc protrusion, mild ventral impression on thecal sac without limiting central or foraminal stenosis. Mild facet arthropathy. L3/4: Mild disc protrusion, mild ventral impression on thecal sac, facet and ligamentous hypertrophy mildly flattening the dorsal lateral thecal sac. Slight T2 hyperintensity suggests annular tear. Mild to moderate left more than right inferior foraminal narrowing. Borderline central stenosis. L4/5: Disc space narrowing, mild to moderate inferior foraminal narrowing secondary to disc/osteophyte, facet and ligamentous hypertrophy. Mild dorsolateral impression on the thecal sac. No limiting central stenosis. L5/S1: Central and foraminal disc/osteophyte, annular tear, mild ventral contact of the thecal sac and descending S1 roots, and right more than left inferior foraminal narrowing, slightly contact the undersurface of the foraminal right L5 nerve root. Facet and ligamentous hypertrophy. Other: Feathery hyperintensity in the posterior paraspinal back musculature. IMPRESSION      1. Multilevel spondylosis and foraminal narrowing without limiting central stenosis. L5/S1 and slight L3/4 annular tear which can be a pain generator. 2. Feathery hyperintensity which can reflect edema/myositis in the paraspinal back musculature. 3. Heterogeneously hyperintense signal in distal cord (T11 level), at the edge of the field-of-view of imaging, typically artifactual and not confirmed on sagittal T1-weighted imaging. If clinical findings are suspicious for thoracic cord findings, then dedicated thoracic spine MRI could best further assess. 15 minutes of face-to-face contact were spent with the patient during today's visit extensively discussing symptoms and treatment plan. All questions were answered.  More than half of this visit today was spent on counseling.      Written by Tasha Arroyo as dictated by Mariel Carbone MD

## 2021-08-26 NOTE — PATIENT INSTRUCTIONS

## 2021-11-18 ENCOUNTER — OFFICE VISIT (OUTPATIENT)
Dept: ORTHOPEDIC SURGERY | Age: 66
End: 2021-11-18
Payer: MEDICARE

## 2021-11-18 VITALS
WEIGHT: 211 LBS | RESPIRATION RATE: 16 BRPM | DIASTOLIC BLOOD PRESSURE: 81 MMHG | TEMPERATURE: 97.2 F | HEIGHT: 72 IN | HEART RATE: 72 BPM | OXYGEN SATURATION: 98 % | SYSTOLIC BLOOD PRESSURE: 124 MMHG | BODY MASS INDEX: 28.58 KG/M2

## 2021-11-18 DIAGNOSIS — G89.29 CHRONIC PRIMARY MUSCULOSKELETAL PAIN: Primary | ICD-10-CM

## 2021-11-18 DIAGNOSIS — M54.16 LUMBAR RADICULOPATHY: ICD-10-CM

## 2021-11-18 DIAGNOSIS — M79.18 CHRONIC PRIMARY MUSCULOSKELETAL PAIN: Primary | ICD-10-CM

## 2021-11-18 DIAGNOSIS — M79.18 MYOFASCIAL PAIN: ICD-10-CM

## 2021-11-18 DIAGNOSIS — M54.59 MECHANICAL LOW BACK PAIN: ICD-10-CM

## 2021-11-18 PROCEDURE — G8427 DOCREV CUR MEDS BY ELIG CLIN: HCPCS | Performed by: PHYSICAL MEDICINE & REHABILITATION

## 2021-11-18 PROCEDURE — G8510 SCR DEP NEG, NO PLAN REQD: HCPCS | Performed by: PHYSICAL MEDICINE & REHABILITATION

## 2021-11-18 PROCEDURE — 99213 OFFICE O/P EST LOW 20 MIN: CPT | Performed by: PHYSICAL MEDICINE & REHABILITATION

## 2021-11-18 PROCEDURE — 3017F COLORECTAL CA SCREEN DOC REV: CPT | Performed by: PHYSICAL MEDICINE & REHABILITATION

## 2021-11-18 PROCEDURE — G8419 CALC BMI OUT NRM PARAM NOF/U: HCPCS | Performed by: PHYSICAL MEDICINE & REHABILITATION

## 2021-11-18 RX ORDER — TIZANIDINE 2 MG/1
4 TABLET ORAL
COMMUNITY
Start: 2021-10-18

## 2021-11-18 NOTE — PROGRESS NOTES
[address]  [address], [address]  [place], [address] [place] [address] Street  Phone: [phone]  Fax: [phone]        Coni Ashraf  : 1955  PCP: Micheal Berrios MD  2021    PROGRESS NOTE      HISTORY OF PRESENT ILLNESS  Miguel Kaplan is a 61 y.o. male who was seen as a new patient 3/10/2020 with c/o chronic back pain (R>L) since a work injury in 2018 with episodic pain radiating into the RLE. He was under the care of Dr. Bella Pritchard during this time. He is no longer covered under  for this injury. Pt notes that he last attended PT a few months ago with temporary relief. Pt reports difficulty getting out of bed, walking, or bending over to pick something up off the floor. He was seen by Sports Medicine who received injections and RFA in the cervical region that provided some relief. Pt notes that he also has a sore on his back from popping a blackhead. I advised he consult his PCP for this. Pt notes that he is trying to obtain disability as his pain is functionally disabling. He has been taking Gabapentin and muscle relaxants without significant benefit. Cervical spine MRI dated 3/14/19 reviewed. Per report, Minimal C4-5, C5-6, C6-7 central stenosis from minimal to mild disc bulging and spondylosis. Ventral cord contact with minimal flattening deformity, C3-4, C4-5, C5-C6 but no abnormal cord signal. Mid cervical straightening contributing to C3-4 level cord contact. Multilevel and bilateral foraminal stenosis, greatest and moderate to severe bilaterally at C4-5, C5-6, left C6-7 with impingement on bilateral C5, C6 and left C7 exiting nerve roots. Thoracic spine MRI dated 3/14/19 reviewed. Per report, Unremarkable MR thoracic spine. Incidental nonspinal findings as above. Lumbar spine MRI dated 10/26/18 reviewed. Per report, Multilevel spondylosis and foraminal narrowing without limiting central stenosis.  L5/S1 and slight L3/4 annular tear which can be a pain generator. Feathery hyperintensity which can reflect edema/myositis in the paraspinal back musculature. Heterogeneously hyperintense signal in distal cord (T11 level), at the edge of the field-of-view of imaging, typically artifactual and not confirmed on sagittal T1-weighted imaging. If clinical findings are suspicious for thoracic cord findings, then dedicated thoracic spine MRI could best further assess. He continued to have low back pain radiating into the posterior LLE to the calf that was worse with sitting, especially riding in the car. He had pain with transitional movements. He has not had any treatment since his last OV because he just received insurance coverage. He has been maintaining a consistent HEP. He occasionally takes Ibuprofen. He has been taking Baclofen, but his wife notes it seems to increase his blood pressure. Gretchen Reyes comes in to the office today for f/u. He went to [address] with about 50% improvement. He notes that his back is weak, but his pain tends to be episodic. It tends to be worse at the end of the day. He has some pain in the morning until he gets going. He found some benefit with Tizanidine prescribed by another provider. Pain Score: 4/10. Treatments patient has tried:  Physical therapy: Yes  Doing HEP: Yes  Non-opioid medications: Yes- Ibuprofen (PRN), baclofen  Spinal injections: No  Spinal surgery- No.   Last Lumbar MRI: 2018     PmHx: acid reflux    ASSESSMENT  Gretchen Reyes is a 61 y.o. male with c/o chronic low back pain radiating into the LLE. His symptoms are likely due to myofascial pain and a potential left-sided radiculopathy. He is neurologically intact. He has responded well to PT. PLAN  1. Lumbar MRI - low back pain radiating into LLE despite>6 weeks conservative tx including PT; evaluate lumbar radiculopathy  2. Continue with HEP. Pt will f/u after MRI or sooner as needed.       Diagnoses and all orders for this visit: 1. Chronic primary musculoskeletal pain    2. Mechanical low back pain    3. Myofascial pain    4. Lumbar radiculopathy         PAST MEDICAL HISTORY   History reviewed. No pertinent past medical history. History reviewed. No pertinent surgical history. Morgan Hackett MEDICATIONS      Current Outpatient Medications   Medication Sig Dispense Refill    tiZANidine (ZANAFLEX) 2 mg tablet TAKE 1 TABLET BY MOUTH TWICE DAILY AS NEEDED FOR SPASMS      predniSONE (STERAPRED DS) 10 mg dose pack See administration instruction per 10mg dose pack 21 Tablet 0    amitriptyline (ELAVIL) 25 mg tablet nightly.  gabapentin (NEURONTIN) 300 mg capsule 300 mg three (3) times daily.  baclofen (LIORESAL) 10 mg tablet three (3) times daily.  omeprazole (PRILOSEC) 10 mg capsule Take 10 mg by mouth daily.  acetaminophen (TYLENOL) 325 mg tablet Take 800 mg by mouth three (3) times daily.  naproxen sodium (ALEVE) 220 mg cap Take  by mouth. (Patient not taking: Reported on 8/26/2021)      meloxicam (MOBIC) 15 mg tablet Take 1 Tab by mouth daily as needed for Pain. (Patient not taking: Reported on 3/10/2020) 30 Tab 1        ALLERGIES  No Known Allergies       SOCIAL HISTORY    Social History     Socioeconomic History    Marital status:    Tobacco Use    Smoking status: Never Smoker    Smokeless tobacco: Never Used   Substance and Sexual Activity    Alcohol use: No    Drug use: No       FAMILY HISTORY  No family history on file. REVIEW OF SYSTEMS  Review of Systems   Constitutional: Negative for chills, fever and weight loss. Respiratory: Negative for shortness of breath. Cardiovascular: Negative for chest pain. Gastrointestinal: Negative for constipation. Negative for fecal incontinence    Genitourinary: Negative for dysuria. Negative for urinary incontinence   Musculoskeletal: Positive for back pain. LLE pain   Skin: Negative for rash.    Neurological: Negative for dizziness, tingling, tremors, focal weakness and headaches. Endo/Heme/Allergies: Does not bruise/bleed easily. Psychiatric/Behavioral: The patient does not have insomnia. PHYSICAL EXAMINATION  Visit Vitals  Ht 6' (1.829 m)   Wt 211 lb (95.7 kg)   BMI 28.62 kg/m²       Pain Assessment  11/18/2021   Location of Pain Back   Location Modifiers -   Severity of Pain 4   Quality of Pain Dull; Other (Comment)   Quality of Pain Comment stiffness   Duration of Pain Other (Comment)   Duration of Pain Comment with movement   Frequency of Pain Other (Comment)   Frequency of Pain Comment with movement   Aggravating Factors Walking   Limiting Behavior Yes   Relieving Factors Exercises   Result of Injury -           Constitutional:  Well developed, well nourished, in no acute distress. Psychiatric: Affect and mood are appropriate. Integumentary: No rashes or abrasions noted on exposed areas. SPINE/MUSCULOSKELETAL EXAM    Cervical spine:  Neck is midline. Normal muscle tone. No focal atrophy is noted. ROM pain free. Shoulder ROM intact.   No tenderness to palpation. Negative Spurling's sign. Negative Tinel's sign. Negative Russo's sign.                                                                                                                             Sensation in the bilateral arms grossly intact to light touch.      Lumbar spine:  No rash, ecchymosis, or gross obliquity. No fasciculations. No focal atrophy is noted. No pain with hip ROM. Full range of motion. Tenderness to palpation of lumbar paraspinals. No tenderness to palpation at the sciatic notch. SI joints non-tender.    Trochanters non tender.     Sensation in the bilateral legs grossly intact to light touch.     Back pain reproduced with stabilization.     Updates 8/26/21:  Negative SLR bilaterally  Intact sensation  Pain with transitional movements  Ambulation with walking stick  Tenderness to palpation lumbar paraspinals    Updates 11/18/21:  Ambulation without assistive device    MOTOR:      Biceps  Triceps Deltoids Wrist Ext Wrist Flex Hand Intrin   Right 5/5 5/5 5/5 5/5 5/5 5/5   Left 5/5 5/5 5/5 5/5 5/5 5/5             Hip Flex  Quads Hamstrings Ankle DF EHL Ankle PF   Right 5/5 5/5 5/5 5/5 5/5 5/5   Left 5/5 5/5 5/5 5/5 5/5 5/5     DTRs are 2+ biceps, triceps, brachioradialis, patella, and Achilles.     Negative Straight Leg raise. Squat not tested. No difficulty with tandem gait.      Ambulation without assistive device. FWB.       RADIOGRAPHS  Lumbar XR images taken on 3/10/2020 personally reviewed with patient:  Spondylitic changes  Significant lean to the R  Disc space narrowing at L4-5 and L5-S1  Facet sclerosis  Potential Baastrup's disease  Normal alignment  No obvious compression deformities    Cervical MRI images taken on 3/14/19 personally reviewed with patient:  Mild mid cervical straightening with loss of upper cervical lordosis but no listhesis    Postop:   None     At C2-3:  Unremarkable disc and facet joints.      At C3-4:  Minimal loss of disc height with mild disc bulge and minimal spondylosis. Low normal canal caliber; no central stenosis. Contact and minimal flattening of the ventral cord due to alignment versus cord difference but no abnormal cord signal. Unremarkable facet joints. No foraminal stenosis      At C4-5:  Mild loss of disc height with mild disc bulge and minimal spondylosis. Minimal central stenosis. Contact and minimal flattening of the ventral cord in part due to alignment versus cord difference but no abnormal cord signal. Unremarkable facet joints. Severe left and moderate right foraminal stenosis from uncovertebral bony hypertrophy with impingement on both exiting C5 nerve roots. At C5-6:  Mild loss of disc height with mild disc bulge and minimal spondylosis. Minimal central stenosis.  Contact and minimal flattening of the ventral cord in part due to alignment versus cord difference but no abnormal cord signal. Unremarkable facet joints. Severe left and right foraminal stenosis from uncovertebral bony hypertrophy with impingement on both exiting C6 nerve roots. At C6-7:  Mild loss of disc height with mild minimally asymmetric left greater than right paracentral disc bulge and minimal spondylosis. Minimal central stenosis. Contact and minimal flattening of the ventral cord in part due to alignment versus cord difference but no abnormal cord signal. Unremarkable facet joints. Severe left and mild right foraminal stenosis from uncovertebral bony hypertrophy with impingement on left exiting C7 nerve root. At C7-T1:  Unremarkable disc and facet joints. Unremarkable upper thoracic discs as included    CERVICAL CORD:  Cervical cord is of otherwise normal caliber and signal.     MISC:  Marrow signal is primarily red marrow, unremarkable for age and gender. Craniocervical junction is within normal limits. Atlantoaxial joint is minimally osteoarthritic    Paraspinal musculature is unremarkable in caliber and signal.     L5-S1 spondylosis on  localizer,      IMPRESSION    1.  Minimal C4-5, C5-6, C6-7 central stenosis from minimal to mild disc bulging and spondylosis. Ventral cord contact with minimal flattening deformity, C3-4, C4-5, C5-C6 but no abnormal cord signal. Mid cervical straightening contributing to C3-4 level cord contact. 2. Multilevel and bilateral foraminal stenosis, greatest and moderate to severe bilaterally at C4-5, C5-6, left C6-7 with impingement on bilateral C5, C6 and left C7 exiting nerve roots.     Thoracic MRI images taken on 3/14/19 personally reviewed with patient:  Alignment is anatomic.      DISC LEVELS:  Unremarkable discs and facet joints. Minimal T11-12 foraminal stenosis from facet arthritis.     THORACIC CORD:  Thoracic cord is of normal caliber and signal. Top normal caliber epidural fat but no canal stenosis    MISC:  Focal round 1 cm hemangioma, T2 vertebral body Marrow signal is otherwise unremarkable for age and gender. Paraspinal musculature is unremarkable in caliber and signal.     Probable small 2 cm round fluid signal lesion in the right lobe liver and 1 cm round fluid signal lesion in the right kidney, both incompletely included visible on only 2 images, indeterminate but most likely benign most likely small cysts, possibly hepatic hemangioma and renal cyst. Curvilinear intermediate signal both lungs, indeterminate also most likely partial atelectasis or scarring. IMPRESSION    1.  Unremarkable MR thoracic spine    2.  Incidental nonspinal findings as above.     Lumbar MRI images taken on 10/26/18 personally reviewed with patient:  The conus terminates at the L1 level, without contour abnormalities. There is heterogeneous hyperintense signal in the distal cord, on T2 and STIR imaging, not confirmed on T1. Axial imaging is not obtained through this level. Marrow/osseous: No T1 or STIR signal abnormalities to suggest acute facet, marrow, edema or focal lesion.       T12/L1: No significant disc protrusion or limiting stenosis is seen. L1/2: No significant disc protrusion or limiting stenosis is seen. L2/3: Mild disc protrusion, mild ventral impression on thecal sac without limiting central or foraminal stenosis. Mild facet arthropathy. L3/4: Mild disc protrusion, mild ventral impression on thecal sac, facet and ligamentous hypertrophy mildly flattening the dorsal lateral thecal sac. Slight T2 hyperintensity suggests annular tear. Mild to moderate left more than right inferior foraminal narrowing. Borderline central stenosis. L4/5: Disc space narrowing, mild to moderate inferior foraminal narrowing secondary to disc/osteophyte, facet and ligamentous hypertrophy. Mild dorsolateral impression on the thecal sac. No limiting central stenosis.     L5/S1: Central and foraminal disc/osteophyte, annular tear, mild ventral contact of the thecal sac and descending S1 roots, and right more than left inferior foraminal narrowing, slightly contact the undersurface of the foraminal right L5 nerve root. Facet and ligamentous hypertrophy. Other: Feathery hyperintensity in the posterior paraspinal back musculature. IMPRESSION      1. Multilevel spondylosis and foraminal narrowing without limiting central stenosis. L5/S1 and slight L3/4 annular tear which can be a pain generator. 2. Feathery hyperintensity which can reflect edema/myositis in the paraspinal back musculature. 3. Heterogeneously hyperintense signal in distal cord (T11 level), at the edge of the field-of-view of imaging, typically artifactual and not confirmed on sagittal T1-weighted imaging. If clinical findings are suspicious for thoracic cord findings, then dedicated thoracic spine MRI could best further assess. 8 minutes of face-to-face contact were spent with the patient during today's visit extensively discussing symptoms and treatment plan. All questions were answered. More than half of this visit today was spent on counseling.      Written by Polly Daley as dictated by Lux Roche MD

## 2021-11-18 NOTE — PROGRESS NOTES
Danis Becerra presents today for   Chief Complaint   Patient presents with    Back Pain       Is someone accompanying this pt? no    Is the patient using any DME equipment during OV? no    Coordination of Care:  1. Have you been to the ER, urgent care clinic since your last visit? Hospitalized since your last visit? no    2. Have you seen or consulted any other health care providers outside of the [address] [place] [address] since your last visit? Include any pap smears or colon screening.  no

## 2021-11-19 DIAGNOSIS — M79.18 MYOFASCIAL PAIN: ICD-10-CM

## 2021-11-19 DIAGNOSIS — M54.16 LUMBAR RADICULOPATHY: ICD-10-CM

## 2021-11-19 DIAGNOSIS — G89.29 CHRONIC PRIMARY MUSCULOSKELETAL PAIN: ICD-10-CM

## 2021-11-19 DIAGNOSIS — M79.18 CHRONIC PRIMARY MUSCULOSKELETAL PAIN: ICD-10-CM

## 2021-11-19 DIAGNOSIS — M54.59 MECHANICAL LOW BACK PAIN: ICD-10-CM

## 2021-11-22 ENCOUNTER — TELEPHONE (OUTPATIENT)
Dept: ORTHOPEDIC SURGERY | Age: 66
End: 2021-11-22

## 2021-11-22 DIAGNOSIS — M54.50 LUMBAR SPINE PAIN: ICD-10-CM

## 2021-11-22 DIAGNOSIS — M79.18 CHRONIC PRIMARY MUSCULOSKELETAL PAIN: Primary | ICD-10-CM

## 2021-11-22 DIAGNOSIS — M54.16 LUMBAR RADICULOPATHY: ICD-10-CM

## 2021-11-22 DIAGNOSIS — G89.29 CHRONIC PRIMARY MUSCULOSKELETAL PAIN: Primary | ICD-10-CM

## 2021-11-22 RX ORDER — DIAZEPAM 5 MG/1
TABLET ORAL
Qty: 2 TABLET | Refills: 0 | Status: SHIPPED | OUTPATIENT
Start: 2021-11-22 | End: 2022-03-14

## 2021-11-22 NOTE — TELEPHONE ENCOUNTER
Patient called and said he will have the mri of the lumbar spine that  ordered at [facility] on 11/28/21. Patient is asking for Diazepam medication to help him through the mri . [facility] on [address]. Tel. [phone]. Patient tel. [phone].

## 2021-11-28 ENCOUNTER — HOSPITAL ENCOUNTER (OUTPATIENT)
Age: 66
Discharge: HOME | End: 2021-11-28
Attending: PHYSICAL MEDICINE & REHABILITATION
Payer: MEDICARE

## 2021-11-28 PROCEDURE — 72148 MRI LUMBAR SPINE W/O DYE: CPT

## 2022-01-06 ENCOUNTER — OFFICE VISIT (OUTPATIENT)
Dept: ORTHOPEDIC SURGERY | Age: 67
End: 2022-01-06
Payer: MEDICARE

## 2022-01-06 VITALS
RESPIRATION RATE: 16 BRPM | BODY MASS INDEX: 28.31 KG/M2 | WEIGHT: 209 LBS | HEART RATE: 63 BPM | OXYGEN SATURATION: 98 % | HEIGHT: 72 IN | DIASTOLIC BLOOD PRESSURE: 67 MMHG | SYSTOLIC BLOOD PRESSURE: 113 MMHG | TEMPERATURE: 96.6 F

## 2022-01-06 DIAGNOSIS — M79.18 CHRONIC PRIMARY MUSCULOSKELETAL PAIN: Primary | ICD-10-CM

## 2022-01-06 DIAGNOSIS — M54.59 MECHANICAL LOW BACK PAIN: ICD-10-CM

## 2022-01-06 DIAGNOSIS — G89.29 CHRONIC PRIMARY MUSCULOSKELETAL PAIN: Primary | ICD-10-CM

## 2022-01-06 DIAGNOSIS — M54.16 LUMBAR NEURITIS: ICD-10-CM

## 2022-01-06 DIAGNOSIS — M54.16 LUMBAR RADICULOPATHY: ICD-10-CM

## 2022-01-06 DIAGNOSIS — M79.18 MYOFASCIAL PAIN: ICD-10-CM

## 2022-01-06 PROCEDURE — 3017F COLORECTAL CA SCREEN DOC REV: CPT | Performed by: PHYSICAL MEDICINE & REHABILITATION

## 2022-01-06 PROCEDURE — 99214 OFFICE O/P EST MOD 30 MIN: CPT | Performed by: PHYSICAL MEDICINE & REHABILITATION

## 2022-01-06 PROCEDURE — G8427 DOCREV CUR MEDS BY ELIG CLIN: HCPCS | Performed by: PHYSICAL MEDICINE & REHABILITATION

## 2022-01-06 PROCEDURE — G8419 CALC BMI OUT NRM PARAM NOF/U: HCPCS | Performed by: PHYSICAL MEDICINE & REHABILITATION

## 2022-01-06 PROCEDURE — G8432 DEP SCR NOT DOC, RNG: HCPCS | Performed by: PHYSICAL MEDICINE & REHABILITATION

## 2022-01-06 RX ORDER — IBUPROFEN 800 MG/1
800 TABLET ORAL
COMMUNITY

## 2022-01-06 RX ORDER — GABAPENTIN 300 MG/1
600 CAPSULE ORAL 3 TIMES DAILY
Qty: 180 CAPSULE | Refills: 2 | Status: SHIPPED | OUTPATIENT
Start: 2022-01-06 | End: 2022-04-08

## 2022-01-06 NOTE — PROGRESS NOTES
[address], [address]  [place]  Phone: [phone]  Fax: [phone]        Jennifer Manjarrez  : 1955  PCP: Caesar Saba MD  2022    PROGRESS NOTE      HISTORY OF PRESENT ILLNESS  Mack Lyle is a 61 y.o. male who was seen as a new patient 3/10/2020 with c/o chronic back pain (R>L) since a work injury in 2018 with episodic pain radiating into the RLE. He was under the care of Dr. Steffany Eli during this time. He is no longer covered under  for this injury. Pt notes that he last attended PT a few months ago with temporary relief. Pt reports difficulty getting out of bed, walking, or bending over to pick something up off the floor. He was seen by Sports Medicine who received injections and RFA in the cervical region that provided some relief. Pt notes that he also has a sore on his back from popping a blackhead. I advised he consult his PCP for this. Pt notes that he is trying to obtain disability as his pain is functionally disabling. He has been taking Gabapentin and muscle relaxants without significant benefit. Cervical spine MRI dated 3/14/19 reviewed. Per report, Minimal C4-5, C5-6, C6-7 central stenosis from minimal to mild disc bulging and spondylosis. Ventral cord contact with minimal flattening deformity, C3-4, C4-5, C5-C6 but no abnormal cord signal. Mid cervical straightening contributing to C3-4 level cord contact. Multilevel and bilateral foraminal stenosis, greatest and moderate to severe bilaterally at C4-5, C5-6, left C6-7 with impingement on bilateral C5, C6 and left C7 exiting nerve roots. Thoracic spine MRI dated 3/14/19 reviewed. Per report, Unremarkable MR thoracic spine. Incidental nonspinal findings as above. Lumbar spine MRI dated 10/26/18 reviewed. Per report, Multilevel spondylosis and foraminal narrowing without limiting central stenosis.  L5/S1 and slight L3/4 annular tear which can be Other (Free Text): Irritant from chemicals during pandemic, on top of dyshidrotic eczema, she does get keratin filled blisters. Detail Level: Zone a pain generator. Feathery hyperintensity which can reflect edema/myositis in the paraspinal back musculature. Heterogeneously hyperintense signal in distal cord (T11 level), at the edge of the field-of-view of imaging, typically artifactual and not confirmed on sagittal T1-weighted imaging. If clinical findings are suspicious for thoracic cord findings, then dedicated thoracic spine MRI could best further assess. He continued to have low back pain radiating into the posterior LLE to the calf that was worse with sitting, especially riding in the car. He had pain with transitional movements. He has not had any treatment since his last OV because he just received insurance coverage. He has been maintaining a consistent HEP. He occasionally takes Ibuprofen. He has been taking Baclofen, but his wife notes it seems to increase his blood pressure. He went to [address] with about 50% improvement. He notes that his back is weak, but his pain tends to be episodic. It tends to be worse at the end of the day. He has some pain in the morning until he gets going. He found some benefit with Tizanidine prescribed by another provider. Nanci Gonzalez comes in to the office today for f/u. He continues to have low back pain radiating into the LLE, especially while riding in the car for 30+ minutes. He also has episodic RLE symptoms, but to a lesser extent. Lumbar spine MRI dated 11/28/21 reviewed. Per report, Comparing to the report submitted in the study notes. Central moderate sized L5 herniation slightly more right-sided may be causing intermittent right S1 nerve root irritation. Facet arthropathy is identified from L3 through S1. No central canal stenosis. Incidental encroachment into the neuroforamina on the right and left side exiting nerve roots do not appear to be compressed. Pain Score: 6/10.     Treatments patient has tried:  Physical therapy: Yes  Doing HEP: Yes  Non-opioid medications: Yes- Ibuprofen (PRN), baclofen, Zanaflex, Amitriptyline, Gabapentin   Spinal injections: No  Spinal surgery- No.   Last Lumbar MRI: 2021     PmHx: acid reflux    ASSESSMENT  Shanthi Rodriguez is a 61 y.o. male with c/o chronic low back pain radiating into the LLE and occasionally into the RLE. His symptoms are likely due to myofascial pain and a potential left-sided radiculopathy. He is neurologically intact. He has responded well to PT. We discussed options of: injections, oral steroids    PLAN  1. Advised he f/u with PCP for incidental finding of a right renal cyst.  2. Increase Gabapentin to 600 mg TID. 3. Bilateral L5 TFESI    Follow-up and Dispositions    · Return for 2-4 weeks after injection. Pt will f/u in 2-4 weeks after injection or sooner as needed. Diagnoses and all orders for this visit:    1. Chronic primary musculoskeletal pain    2. Mechanical low back pain    3. Myofascial pain    4. Lumbar neuritis    5. Lumbar radiculopathy         PAST MEDICAL HISTORY   History reviewed. No pertinent past medical history. History reviewed. No pertinent surgical history. Cloteal Childers MEDICATIONS      Current Outpatient Medications   Medication Sig Dispense Refill    ibuprofen (MOTRIN) 800 mg tablet Take  by mouth.  tiZANidine (ZANAFLEX) 2 mg tablet TAKE 1 TABLET BY MOUTH TWICE DAILY AS NEEDED FOR SPASMS      amitriptyline (ELAVIL) 25 mg tablet nightly.  omeprazole (PRILOSEC) 10 mg capsule Take 10 mg by mouth daily.  acetaminophen (TYLENOL) 325 mg tablet Take 800 mg by mouth three (3) times daily.  diazePAM (Valium) 5 mg tablet Take 1 tab 30 mins prior to MRI, take 1 tab immediate prior to MRI  Indications: inducing of a relaxed easy state (Patient not taking: Reported on 1/6/2022) 2 Tablet 0    predniSONE (STERAPRED DS) 10 mg dose pack See administration instruction per 10mg dose pack (Patient not taking: Reported on 1/6/2022) 21 Tablet 0    baclofen (LIORESAL) 10 mg tablet three (3) times daily. Note Text (......Xxx Chief Complaint.): This diagnosis correlates with the (Patient not taking: Reported on 1/6/2022)      naproxen sodium (ALEVE) 220 mg cap Take  by mouth. (Patient not taking: Reported on 8/26/2021)      meloxicam (MOBIC) 15 mg tablet Take 1 Tab by mouth daily as needed for Pain. (Patient not taking: Reported on 1/6/2022) 30 Tab 1        ALLERGIES  No Known Allergies       SOCIAL HISTORY    Social History     Socioeconomic History    Marital status:    Tobacco Use    Smoking status: Never Smoker    Smokeless tobacco: Never Used   Vaping Use    Vaping Use: Never used   Substance and Sexual Activity    Alcohol use: No    Drug use: No       FAMILY HISTORY  History reviewed. No pertinent family history. REVIEW OF SYSTEMS  Review of Systems   Constitutional: Negative for chills, fever and weight loss. Respiratory: Negative for shortness of breath. Cardiovascular: Negative for chest pain. Gastrointestinal: Negative for constipation. Negative for fecal incontinence    Genitourinary: Negative for dysuria. Negative for urinary incontinence   Musculoskeletal: Positive for back pain. BLE pain, L>>R   Skin: Negative for rash. Neurological: Negative for dizziness, tingling, tremors, focal weakness and headaches. Endo/Heme/Allergies: Does not bruise/bleed easily. Psychiatric/Behavioral: The patient does not have insomnia.            PHYSICAL EXAMINATION  Visit Vitals  /67 (BP 1 Location: Right arm, BP Patient Position: Sitting, BP Cuff Size: Adult)   Pulse 63   Temp (!) 96.6 °F (35.9 °C) (Tympanic)   Resp 16   Ht 6' (1.829 m)   Wt 209 lb (94.8 kg)   SpO2 98%   BMI 28.35 kg/m²       Pain Assessment  1/6/2022   Location of Pain Back;Leg   Location Modifiers Right;Left   Severity of Pain 6   Quality of Pain Sharp;Dull   Quality of Pain Comment sharp to legs dull to back   Duration of Pain Persistent   Duration of Pain Comment -   Frequency of Pain Constant   Frequency of Pain Comment -   Date Pain First Started (No Data)   Date Pain First Started Comment years   Aggravating Factors Walking   Limiting Behavior Yes   Relieving Factors Exercises   Result of Injury Yes   Work-Related Injury Yes   How long out of work? on disablity   Type of Injury Other (Comment)   Type of Injury Comment it just happened           Constitutional:  Well developed, well nourished, in no acute distress. Psychiatric: Affect and mood are appropriate. Integumentary: No rashes or abrasions noted on exposed areas. SPINE/MUSCULOSKELETAL EXAM    Cervical spine:  Neck is midline. Normal muscle tone. No focal atrophy is noted. ROM pain free. Shoulder ROM intact.   No tenderness to palpation. Negative Spurling's sign. Negative Tinel's sign. Negative Russo's sign.                                                                                                                             Sensation in the bilateral arms grossly intact to light touch.      Lumbar spine:  No rash, ecchymosis, or gross obliquity. No fasciculations. No focal atrophy is noted. No pain with hip ROM. Full range of motion. Tenderness to palpation of lumbar paraspinals. No tenderness to palpation at the sciatic notch. SI joints non-tender. Trochanters non tender.     Sensation in the bilateral legs grossly intact to light touch.     Back pain reproduced with stabilization.     Updates 8/26/21:  Negative SLR bilaterally  Intact sensation  Pain with transitional movements  Ambulation with walking stick  Tenderness to palpation lumbar paraspinals     Updates 11/18/21:  Ambulation without assistive device    MOTOR:      Biceps  Triceps Deltoids Wrist Ext Wrist Flex Hand Intrin   Right 5/5 5/5 5/5 5/5 5/5 5/5   Left 5/5 5/5 5/5 5/5 5/5 5/5             Hip Flex  Quads Hamstrings Ankle DF EHL Ankle PF   Right 5/5 5/5 5/5 5/5 5/5 5/5   Left 5/5 5/5 5/5 5/5 5/5 5/5     DTRs are 2+ biceps, triceps, brachioradialis, patella, and Achilles.     Negative Straight Leg raise. Render Risk Assessment In Note?: no Squat not tested. No difficulty with tandem gait.      Ambulation without assistive device.  FWB.       RADIOGRAPHS  Lumbar MRI images taken on 11/28/21 personally reviewed with patient:  Alignment: Intact lordosis  Vertebral body height: Normal  Marrow signal: Unremarkable  Disc spaces: Preserved height and signal intensity  Conus: Terminates at T12-L1     Axial imaging correlation:     L1-2: Patent canal and foramina.     L2-3: Mild signal loss narrowing of the disc space noted there is a central  protrusion-type small herniation present     Central canal is nonstenotic     Mild arthritic changes identified within the facets     Bulge in the right neuroforamina exiting nerve root not compressed     Similar bulge into the left neuroforamina exiting nerve root not compressed     L3-4: Moderate narrowing mild signal loss at the L3-4 level noted     Central canal is nonstenotic  Facets demonstrate hypertrophic changes     Exiting right L3 root not compressed     Small amount of protrusion herniation osteophyte projecting into the left  neuroforamina exiting left L3 root is not compressed     L4-5: Moderate intradiscal degenerative changes are present there is a mild  protrusion-type bulge across the midline     Central canal is nonstenotic     Right neuroforamina demonstrates exiting L4 root not compressed     Left L4 root not compressed     Facets demonstrate moderate hypertrophic changes     L5-S1: Moderate intradiscal degenerative changes present anterior osteophytic  changes small amount present     There is a central protrusion-type herniation moderate size extends into the  neuroforamina     Central canal is nonstenotic     Right neuroforamina exiting nerve root is not compressed     Left neuroforamina exiting nerve root is not compressed     Facets demonstrate mild arthritic and hypertrophic changes     Other structures: Benign upper pole cysts identified in the right kidney maximum  dimension 1.5 cm no features of malignancy portions the adrenal glands included  are clear     Aorta nonaneurysmal no adenopathy        IMPRESSION     Comparing to the report submitted in the study notes     Central moderate sized L5 herniation slightly more right-sided may be causing  intermittent right S1 nerve root irritation     Facet arthropathy is identified from L3 through S1     No central canal stenosis     Incidental encroachment into the neuroforamina on the right and left side  exiting nerve roots do not appear to be compressed    Lumbar XR images taken on 3/10/2020 personally reviewed with patient:  Spondylitic changes  Significant lean to the R  Disc space narrowing at L4-5 and L5-S1  Facet sclerosis  Potential Baastrup's disease  Normal alignment  No obvious compression deformities    Cervical MRI images taken on 3/14/19 personally reviewed with patient:  Mild mid cervical straightening with loss of upper cervical lordosis but no listhesis    Postop:   None     At C2-3:  Unremarkable disc and facet joints.      At C3-4:  Minimal loss of disc height with mild disc bulge and minimal spondylosis. Low normal canal caliber; no central stenosis. Contact and minimal flattening of the ventral cord due to alignment versus cord difference but no abnormal cord signal. Unremarkable facet joints. No foraminal stenosis      At C4-5:  Mild loss of disc height with mild disc bulge and minimal spondylosis. Minimal central stenosis. Contact and minimal flattening of the ventral cord in part due to alignment versus cord difference but no abnormal cord signal. Unremarkable facet joints. Severe left and moderate right foraminal stenosis from uncovertebral bony hypertrophy with impingement on both exiting C5 nerve roots. At C5-6:  Mild loss of disc height with mild disc bulge and minimal spondylosis. Minimal central stenosis.  Contact and minimal flattening of the ventral cord in part due to alignment versus cord difference but no abnormal cord signal. Unremarkable facet joints. Severe left and right foraminal stenosis from uncovertebral bony hypertrophy with impingement on both exiting C6 nerve roots. At C6-7:  Mild loss of disc height with mild minimally asymmetric left greater than right paracentral disc bulge and minimal spondylosis. Minimal central stenosis. Contact and minimal flattening of the ventral cord in part due to alignment versus cord difference but no abnormal cord signal. Unremarkable facet joints. Severe left and mild right foraminal stenosis from uncovertebral bony hypertrophy with impingement on left exiting C7 nerve root. At C7-T1:  Unremarkable disc and facet joints. Unremarkable upper thoracic discs as included    CERVICAL CORD:  Cervical cord is of otherwise normal caliber and signal.     MISC:  Marrow signal is primarily red marrow, unremarkable for age and gender. Craniocervical junction is within normal limits. Atlantoaxial joint is minimally osteoarthritic    Paraspinal musculature is unremarkable in caliber and signal.     L5-S1 spondylosis on  localizer,      IMPRESSION    1.  Minimal C4-5, C5-6, C6-7 central stenosis from minimal to mild disc bulging and spondylosis. Ventral cord contact with minimal flattening deformity, C3-4, C4-5, C5-C6 but no abnormal cord signal. Mid cervical straightening contributing to C3-4 level cord contact. 2. Multilevel and bilateral foraminal stenosis, greatest and moderate to severe bilaterally at C4-5, C5-6, left C6-7 with impingement on bilateral C5, C6 and left C7 exiting nerve roots.     Thoracic MRI images taken on 3/14/19 personally reviewed with patient:  Alignment is anatomic.      DISC LEVELS:  Unremarkable discs and facet joints. Minimal T11-12 foraminal stenosis from facet arthritis.     THORACIC CORD:  Thoracic cord is of normal caliber and signal. Top normal caliber epidural fat but no canal stenosis    MISC:  Focal round 1 cm hemangioma, T2 vertebral body Marrow signal is otherwise unremarkable for age and gender. Paraspinal musculature is unremarkable in caliber and signal.     Probable small 2 cm round fluid signal lesion in the right lobe liver and 1 cm round fluid signal lesion in the right kidney, both incompletely included visible on only 2 images, indeterminate but most likely benign most likely small cysts, possibly hepatic hemangioma and renal cyst. Curvilinear intermediate signal both lungs, indeterminate also most likely partial atelectasis or scarring. IMPRESSION    1.  Unremarkable MR thoracic spine    2.  Incidental nonspinal findings as above.     Lumbar MRI images taken on 10/26/18 personally reviewed with patient:  The conus terminates at the L1 level, without contour abnormalities. There is heterogeneous hyperintense signal in the distal cord, on T2 and STIR imaging, not confirmed on T1. Axial imaging is not obtained through this level. Marrow/osseous: No T1 or STIR signal abnormalities to suggest acute facet, marrow, edema or focal lesion.       T12/L1: No significant disc protrusion or limiting stenosis is seen. L1/2: No significant disc protrusion or limiting stenosis is seen. L2/3: Mild disc protrusion, mild ventral impression on thecal sac without limiting central or foraminal stenosis. Mild facet arthropathy. L3/4: Mild disc protrusion, mild ventral impression on thecal sac, facet and ligamentous hypertrophy mildly flattening the dorsal lateral thecal sac. Slight T2 hyperintensity suggests annular tear. Mild to moderate left more than right inferior foraminal narrowing. Borderline central stenosis. L4/5: Disc space narrowing, mild to moderate inferior foraminal narrowing secondary to disc/osteophyte, facet and ligamentous hypertrophy. Mild dorsolateral impression on the thecal sac. No limiting central stenosis.     L5/S1: Central and foraminal disc/osteophyte, annular tear, mild ventral contact of the thecal sac and descending S1 roots, and right more than left inferior foraminal narrowing, slightly contact the undersurface of the foraminal right L5 nerve root. Facet and ligamentous hypertrophy. Other: Feathery hyperintensity in the posterior paraspinal back musculature. IMPRESSION      1. Multilevel spondylosis and foraminal narrowing without limiting central stenosis. L5/S1 and slight L3/4 annular tear which can be a pain generator. 2. Feathery hyperintensity which can reflect edema/myositis in the paraspinal back musculature. 3. Heterogeneously hyperintense signal in distal cord (T11 level), at the edge of the field-of-view of imaging, typically artifactual and not confirmed on sagittal T1-weighted imaging. If clinical findings are suspicious for thoracic cord findings, then dedicated thoracic spine MRI could best further assess. 35 minutes of face-to-face contact were spent with the patient during today's visit extensively discussing symptoms and treatment plan. All questions were answered. More than half of this visit today was spent on counseling.      Written by Maile Henderson as dictated by Aisha Ashraf MD Other (Free Text): Treated with LN2 one month ago. Residual redness. Discussed sun block, possible underlying rosacea, since she picks at pimples.

## 2022-01-06 NOTE — H&P (VIEW-ONLY)
[address]  [address], [address]  [place], [facility] [address]  Phone: [phone]  Fax: [phone]        Silverio Boyd  : 1955  PCP: Radha Mcgee MD  2022    PROGRESS NOTE      HISTORY OF PRESENT ILLNESS  Rehan Zuleta is a 61 y.o. male who was seen as a new patient 3/10/2020 with c/o chronic back pain (R>L) since a work injury in 2018 with episodic pain radiating into the RLE. He was under the care of Dr. Brent Michaels during this time. He is no longer covered under  for this injury. Pt notes that he last attended PT a few months ago with temporary relief. Pt reports difficulty getting out of bed, walking, or bending over to pick something up off the floor. He was seen by Sports Medicine who received injections and RFA in the cervical region that provided some relief. Pt notes that he also has a sore on his back from popping a blackhead. I advised he consult his PCP for this. Pt notes that he is trying to obtain disability as his pain is functionally disabling. He has been taking Gabapentin and muscle relaxants without significant benefit. Cervical spine MRI dated 3/14/19 reviewed. Per report, Minimal C4-5, C5-6, C6-7 central stenosis from minimal to mild disc bulging and spondylosis. Ventral cord contact with minimal flattening deformity, C3-4, C4-5, C5-C6 but no abnormal cord signal. Mid cervical straightening contributing to C3-4 level cord contact. Multilevel and bilateral foraminal stenosis, greatest and moderate to severe bilaterally at C4-5, C5-6, left C6-7 with impingement on bilateral C5, C6 and left C7 exiting nerve roots. Thoracic spine MRI dated 3/14/19 reviewed. Per report, Unremarkable MR thoracic spine. Incidental nonspinal findings as above. Lumbar spine MRI dated 10/26/18 reviewed. Per report, Multilevel spondylosis and foraminal narrowing without limiting central stenosis.  L5/S1 and slight L3/4 annular tear which can be a pain generator. Feathery hyperintensity which can reflect edema/myositis in the paraspinal back musculature. Heterogeneously hyperintense signal in distal cord (T11 level), at the edge of the field-of-view of imaging, typically artifactual and not confirmed on sagittal T1-weighted imaging. If clinical findings are suspicious for thoracic cord findings, then dedicated thoracic spine MRI could best further assess. He continued to have low back pain radiating into the posterior LLE to the calf that was worse with sitting, especially riding in the car. He had pain with transitional movements. He has not had any treatment since his last OV because he just received insurance coverage. He has been maintaining a consistent HEP. He occasionally takes Ibuprofen. He has been taking Baclofen, but his wife notes it seems to increase his blood pressure. He went to [address] with about 50% improvement. He notes that his back is weak, but his pain tends to be episodic. It tends to be worse at the end of the day. He has some pain in the morning until he gets going. He found some benefit with Tizanidine prescribed by another provider. Marcy Pineda comes in to the office today for f/u. He continues to have low back pain radiating into the LLE, especially while riding in the car for 30+ minutes. He also has episodic RLE symptoms, but to a lesser extent. Lumbar spine MRI dated 11/28/21 reviewed. Per report, Comparing to the report submitted in the study notes. Central moderate sized L5 herniation slightly more right-sided may be causing intermittent right S1 nerve root irritation. Facet arthropathy is identified from L3 through S1. No central canal stenosis. Incidental encroachment into the neuroforamina on the right and left side exiting nerve roots do not appear to be compressed. Pain Score: 6/10.     Treatments patient has tried:  Physical therapy: Yes  Doing HEP: Yes  Non-opioid medications: Yes- Ibuprofen (PRN), baclofen, Zanaflex, Amitriptyline, Gabapentin   Spinal injections: No  Spinal surgery- No.   Last Lumbar MRI: 2021     PmHx: acid reflux    ASSESSMENT  Malorie Bull is a 61 y.o. male with c/o chronic low back pain radiating into the LLE and occasionally into the RLE. His symptoms are likely due to myofascial pain and a potential left-sided radiculopathy. He is neurologically intact. He has responded well to PT. We discussed options of: injections, oral steroids    PLAN  1. Advised he f/u with PCP for incidental finding of a right renal cyst.  2. Increase Gabapentin to 600 mg TID. 3. Bilateral L5 TFESI    Follow-up and Dispositions    · Return for 2-4 weeks after injection. Pt will f/u in 2-4 weeks after injection or sooner as needed. Diagnoses and all orders for this visit:    1. Chronic primary musculoskeletal pain    2. Mechanical low back pain    3. Myofascial pain    4. Lumbar neuritis    5. Lumbar radiculopathy         PAST MEDICAL HISTORY   History reviewed. No pertinent past medical history. History reviewed. No pertinent surgical history. Emily Zheng MEDICATIONS      Current Outpatient Medications   Medication Sig Dispense Refill    ibuprofen (MOTRIN) 800 mg tablet Take  by mouth.  tiZANidine (ZANAFLEX) 2 mg tablet TAKE 1 TABLET BY MOUTH TWICE DAILY AS NEEDED FOR SPASMS      amitriptyline (ELAVIL) 25 mg tablet nightly.  omeprazole (PRILOSEC) 10 mg capsule Take 10 mg by mouth daily.  acetaminophen (TYLENOL) 325 mg tablet Take 800 mg by mouth three (3) times daily.  diazePAM (Valium) 5 mg tablet Take 1 tab 30 mins prior to MRI, take 1 tab immediate prior to MRI  Indications: inducing of a relaxed easy state (Patient not taking: Reported on 1/6/2022) 2 Tablet 0    predniSONE (STERAPRED DS) 10 mg dose pack See administration instruction per 10mg dose pack (Patient not taking: Reported on 1/6/2022) 21 Tablet 0    baclofen (LIORESAL) 10 mg tablet three (3) times daily. (Patient not taking: Reported on 1/6/2022)      naproxen sodium (ALEVE) 220 mg cap Take  by mouth. (Patient not taking: Reported on 8/26/2021)      meloxicam (MOBIC) 15 mg tablet Take 1 Tab by mouth daily as needed for Pain. (Patient not taking: Reported on 1/6/2022) 30 Tab 1        ALLERGIES  No Known Allergies       SOCIAL HISTORY    Social History     Socioeconomic History    Marital status:    Tobacco Use    Smoking status: Never Smoker    Smokeless tobacco: Never Used   Vaping Use    Vaping Use: Never used   Substance and Sexual Activity    Alcohol use: No    Drug use: No       FAMILY HISTORY  History reviewed. No pertinent family history. REVIEW OF SYSTEMS  Review of Systems   Constitutional: Negative for chills, fever and weight loss. Respiratory: Negative for shortness of breath. Cardiovascular: Negative for chest pain. Gastrointestinal: Negative for constipation. Negative for fecal incontinence    Genitourinary: Negative for dysuria. Negative for urinary incontinence   Musculoskeletal: Positive for back pain. BLE pain, L>>R   Skin: Negative for rash. Neurological: Negative for dizziness, tingling, tremors, focal weakness and headaches. Endo/Heme/Allergies: Does not bruise/bleed easily. Psychiatric/Behavioral: The patient does not have insomnia.            PHYSICAL EXAMINATION  Visit Vitals  /67 (BP 1 Location: Right arm, BP Patient Position: Sitting, BP Cuff Size: Adult)   Pulse 63   Temp (!) 96.6 °F (35.9 °C) (Tympanic)   Resp 16   Ht 6' (1.829 m)   Wt 209 lb (94.8 kg)   SpO2 98%   BMI 28.35 kg/m²       Pain Assessment  1/6/2022   Location of Pain Back;Leg   Location Modifiers Right;Left   Severity of Pain 6   Quality of Pain Sharp;Dull   Quality of Pain Comment sharp to legs dull to back   Duration of Pain Persistent   Duration of Pain Comment -   Frequency of Pain Constant   Frequency of Pain Comment -   Date Pain First Started (No Data)   Date Pain First Started Comment years   Aggravating Factors Walking   Limiting Behavior Yes   Relieving Factors Exercises   Result of Injury Yes   Work-Related Injury Yes   How long out of work? on disablity   Type of Injury Other (Comment)   Type of Injury Comment it just happened           Constitutional:  Well developed, well nourished, in no acute distress. Psychiatric: Affect and mood are appropriate. Integumentary: No rashes or abrasions noted on exposed areas. SPINE/MUSCULOSKELETAL EXAM    Cervical spine:  Neck is midline. Normal muscle tone. No focal atrophy is noted. ROM pain free. Shoulder ROM intact.   No tenderness to palpation. Negative Spurling's sign. Negative Tinel's sign. Negative Russo's sign.                                                                                                                             Sensation in the bilateral arms grossly intact to light touch.      Lumbar spine:  No rash, ecchymosis, or gross obliquity. No fasciculations. No focal atrophy is noted. No pain with hip ROM. Full range of motion. Tenderness to palpation of lumbar paraspinals. No tenderness to palpation at the sciatic notch. SI joints non-tender. Trochanters non tender.     Sensation in the bilateral legs grossly intact to light touch.     Back pain reproduced with stabilization.     Updates 8/26/21:  Negative SLR bilaterally  Intact sensation  Pain with transitional movements  Ambulation with walking stick  Tenderness to palpation lumbar paraspinals     Updates 11/18/21:  Ambulation without assistive device    MOTOR:      Biceps  Triceps Deltoids Wrist Ext Wrist Flex Hand Intrin   Right 5/5 5/5 5/5 5/5 5/5 5/5   Left 5/5 5/5 5/5 5/5 5/5 5/5             Hip Flex  Quads Hamstrings Ankle DF EHL Ankle PF   Right 5/5 5/5 5/5 5/5 5/5 5/5   Left 5/5 5/5 5/5 5/5 5/5 5/5     DTRs are 2+ biceps, triceps, brachioradialis, patella, and Achilles.     Negative Straight Leg raise. Squat not tested. No difficulty with tandem gait.      Ambulation without assistive device.  FWB.       RADIOGRAPHS  Lumbar MRI images taken on 11/28/21 personally reviewed with patient:  Alignment: Intact lordosis  Vertebral body height: Normal  Marrow signal: Unremarkable  Disc spaces: Preserved height and signal intensity  Conus: Terminates at T12-L1     Axial imaging correlation:     L1-2: Patent canal and foramina.     L2-3: Mild signal loss narrowing of the disc space noted there is a central  protrusion-type small herniation present     Central canal is nonstenotic     Mild arthritic changes identified within the facets     Bulge in the right neuroforamina exiting nerve root not compressed     Similar bulge into the left neuroforamina exiting nerve root not compressed     L3-4: Moderate narrowing mild signal loss at the L3-4 level noted     Central canal is nonstenotic  Facets demonstrate hypertrophic changes     Exiting right L3 root not compressed     Small amount of protrusion herniation osteophyte projecting into the left  neuroforamina exiting left L3 root is not compressed     L4-5: Moderate intradiscal degenerative changes are present there is a mild  protrusion-type bulge across the midline     Central canal is nonstenotic     Right neuroforamina demonstrates exiting L4 root not compressed     Left L4 root not compressed     Facets demonstrate moderate hypertrophic changes     L5-S1: Moderate intradiscal degenerative changes present anterior osteophytic  changes small amount present     There is a central protrusion-type herniation moderate size extends into the  neuroforamina     Central canal is nonstenotic     Right neuroforamina exiting nerve root is not compressed     Left neuroforamina exiting nerve root is not compressed     Facets demonstrate mild arthritic and hypertrophic changes     Other structures: Benign upper pole cysts identified in the right kidney maximum  dimension 1.5 cm no features of malignancy portions the adrenal glands included  are clear     Aorta nonaneurysmal no adenopathy        IMPRESSION     Comparing to the report submitted in the study notes     Central moderate sized L5 herniation slightly more right-sided may be causing  intermittent right S1 nerve root irritation     Facet arthropathy is identified from L3 through S1     No central canal stenosis     Incidental encroachment into the neuroforamina on the right and left side  exiting nerve roots do not appear to be compressed    Lumbar XR images taken on 3/10/2020 personally reviewed with patient:  Spondylitic changes  Significant lean to the R  Disc space narrowing at L4-5 and L5-S1  Facet sclerosis  Potential Baastrup's disease  Normal alignment  No obvious compression deformities    Cervical MRI images taken on 3/14/19 personally reviewed with patient:  Mild mid cervical straightening with loss of upper cervical lordosis but no listhesis    Postop:   None     At C2-3:  Unremarkable disc and facet joints.      At C3-4:  Minimal loss of disc height with mild disc bulge and minimal spondylosis. Low normal canal caliber; no central stenosis. Contact and minimal flattening of the ventral cord due to alignment versus cord difference but no abnormal cord signal. Unremarkable facet joints. No foraminal stenosis      At C4-5:  Mild loss of disc height with mild disc bulge and minimal spondylosis. Minimal central stenosis. Contact and minimal flattening of the ventral cord in part due to alignment versus cord difference but no abnormal cord signal. Unremarkable facet joints. Severe left and moderate right foraminal stenosis from uncovertebral bony hypertrophy with impingement on both exiting C5 nerve roots. At C5-6:  Mild loss of disc height with mild disc bulge and minimal spondylosis. Minimal central stenosis.  Contact and minimal flattening of the ventral cord in part due to alignment versus cord difference but no abnormal cord signal. Unremarkable facet joints. Severe left and right foraminal stenosis from uncovertebral bony hypertrophy with impingement on both exiting C6 nerve roots. At C6-7:  Mild loss of disc height with mild minimally asymmetric left greater than right paracentral disc bulge and minimal spondylosis. Minimal central stenosis. Contact and minimal flattening of the ventral cord in part due to alignment versus cord difference but no abnormal cord signal. Unremarkable facet joints. Severe left and mild right foraminal stenosis from uncovertebral bony hypertrophy with impingement on left exiting C7 nerve root. At C7-T1:  Unremarkable disc and facet joints. Unremarkable upper thoracic discs as included    CERVICAL CORD:  Cervical cord is of otherwise normal caliber and signal.     MISC:  Marrow signal is primarily red marrow, unremarkable for age and gender. Craniocervical junction is within normal limits. Atlantoaxial joint is minimally osteoarthritic    Paraspinal musculature is unremarkable in caliber and signal.     L5-S1 spondylosis on  localizer,      IMPRESSION    1.  Minimal C4-5, C5-6, C6-7 central stenosis from minimal to mild disc bulging and spondylosis. Ventral cord contact with minimal flattening deformity, C3-4, C4-5, C5-C6 but no abnormal cord signal. Mid cervical straightening contributing to C3-4 level cord contact. 2. Multilevel and bilateral foraminal stenosis, greatest and moderate to severe bilaterally at C4-5, C5-6, left C6-7 with impingement on bilateral C5, C6 and left C7 exiting nerve roots.     Thoracic MRI images taken on 3/14/19 personally reviewed with patient:  Alignment is anatomic.      DISC LEVELS:  Unremarkable discs and facet joints. Minimal T11-12 foraminal stenosis from facet arthritis.     THORACIC CORD:  Thoracic cord is of normal caliber and signal. Top normal caliber epidural fat but no canal stenosis    MISC:  Focal round 1 cm hemangioma, T2 vertebral body Marrow signal is otherwise unremarkable for age and gender. Paraspinal musculature is unremarkable in caliber and signal.     Probable small 2 cm round fluid signal lesion in the right lobe liver and 1 cm round fluid signal lesion in the right kidney, both incompletely included visible on only 2 images, indeterminate but most likely benign most likely small cysts, possibly hepatic hemangioma and renal cyst. Curvilinear intermediate signal both lungs, indeterminate also most likely partial atelectasis or scarring. IMPRESSION    1.  Unremarkable MR thoracic spine    2.  Incidental nonspinal findings as above.     Lumbar MRI images taken on 10/26/18 personally reviewed with patient:  The conus terminates at the L1 level, without contour abnormalities. There is heterogeneous hyperintense signal in the distal cord, on T2 and STIR imaging, not confirmed on T1. Axial imaging is not obtained through this level. Marrow/osseous: No T1 or STIR signal abnormalities to suggest acute facet, marrow, edema or focal lesion.       T12/L1: No significant disc protrusion or limiting stenosis is seen. L1/2: No significant disc protrusion or limiting stenosis is seen. L2/3: Mild disc protrusion, mild ventral impression on thecal sac without limiting central or foraminal stenosis. Mild facet arthropathy. L3/4: Mild disc protrusion, mild ventral impression on thecal sac, facet and ligamentous hypertrophy mildly flattening the dorsal lateral thecal sac. Slight T2 hyperintensity suggests annular tear. Mild to moderate left more than right inferior foraminal narrowing. Borderline central stenosis. L4/5: Disc space narrowing, mild to moderate inferior foraminal narrowing secondary to disc/osteophyte, facet and ligamentous hypertrophy. Mild dorsolateral impression on the thecal sac. No limiting central stenosis.     L5/S1: Central and foraminal disc/osteophyte, annular tear, mild ventral contact of the thecal sac and descending S1 roots, and right more than left inferior foraminal narrowing, slightly contact the undersurface of the foraminal right L5 nerve root. Facet and ligamentous hypertrophy. Other: Feathery hyperintensity in the posterior paraspinal back musculature. IMPRESSION      1. Multilevel spondylosis and foraminal narrowing without limiting central stenosis. L5/S1 and slight L3/4 annular tear which can be a pain generator. 2. Feathery hyperintensity which can reflect edema/myositis in the paraspinal back musculature. 3. Heterogeneously hyperintense signal in distal cord (T11 level), at the edge of the field-of-view of imaging, typically artifactual and not confirmed on sagittal T1-weighted imaging. If clinical findings are suspicious for thoracic cord findings, then dedicated thoracic spine MRI could best further assess. 35 minutes of face-to-face contact were spent with the patient during today's visit extensively discussing symptoms and treatment plan. All questions were answered. More than half of this visit today was spent on counseling.      Written by Jeannine Fitzgerald as dictated by Wilner Majnarrez MD

## 2022-01-06 NOTE — PROGRESS NOTES
Chief Complaint   Patient presents with    Results     mri       Pt preferred language for health care discussion is english. Is someone accompanying this pt? Yes wife    Is the patient using any DME equipment during [address]? cane    Depression Screening:  3 most recent PHQ Screens 11/18/2021   Little interest or pleasure in doing things Not at all   Feeling down, depressed, irritable, or hopeless Not at all   Total Score PHQ 2 0       Learning Assessment:  No flowsheet data found. Health Maintenance reviewed and discussed per provider. Yes        Advance Directive:  1. Do you have an advance directive in place? Patient Reply:no    2. If not, would you like material regarding how to put one in place? Patient Reply: no    Coordination of Care:  1. Have you been to the ER, urgent care clinic since your last visit? Hospitalized since your last visit? no    2. Have you seen or consulted any other health care providers outside of the [address] [place] [address] since your last visit? Include any pap smears or colon screening.  no

## 2022-01-06 NOTE — PATIENT INSTRUCTIONS
Gabapentin (Neurontin) instructions: We will increase your current dose from 300 mg (1 pill) three times a day to 600 mg (2 pills) three times a day. Morning Afternoon Night   Week 1 1 pill 1 pill 2 pills   Week 2 2 pills 1 pill 2 pills   Week 3 and onwards 2 pills 2 pills 2 pills       Week 1: Take an additional 300 mg pill at night to your current dose so that you are taking 1 pill in the morning, 1 pill in the afternoon, and 2 pills at night. Week 2: Take an additional 300 mg pill in the morning so that you are taking 2 pills in the morning, 1 pill in the afternoon, and 2 pills at night. Week 3 and onwards: Take an additional 300 mg pill in the afternoon so that you are taking 2 pills in the morning, 2 pills in the afternoon, and 2 pills at night. Continue taking this dose each day. Learning About Lumbar Epidural Steroid Injections  What is a lumbar epidural steroid injection? A lumbar epidural injection is a shot into the epidural space--the area in your back around the spinal cord. The shot may help reduce pain, tingling, or numbness in your back, buttock, or leg. The shot may have a steroid to reduce pain and swelling and a local anesthetic to numb nerves. How is a lumbar epidural steroid injection done? The doctor may use an imaging test before or during your injection. This can be an MRI, a CT scan, or an X-ray. These tests can show where your nerve problems are. After finding the right spot, the doctor may inject a numbing medicine into the skin where you will get the steroid injection. Then he or she puts the needle for the steroid into the numbed area. You may feel some pressure. You could feel some stinging or burning during the injection. How long does an epidural steroid injection take? It takes about 10 to 15 minutes to get this injection. You will probably go home about 20 to 30 minutes after you get it.   What can you expect after a lumbar epidural steroid injection? If your injection had local anesthetic and a steroid, your legs may feel heavy or numb right after. You will probably be able to walk. But you may need to be extra careful. Take care not to lose your balance, and be sure to follow your doctor's instructions. If your injection contained local anesthetic, you may feel better right away. But this pain relief will last only a few hours. Your pain will probably return. This is because the steroids have not started working yet. Before the steroids start to work, your back may be sore for a few days. These injections don't always work. When they do, it takes 1 to 5 days. This pain relief can last for several days to a few months or longer. You may want to do less than normal for a few days. But you may also be able to return to your daily routine. Some people are dizzy or feel sick to their stomach after getting this injection. These symptoms usually don't last very long. If your pain is better, you may be able to keep doing your normal activities or physical therapy. But try not to overdo it, even if your back pain has improved a lot. If your pain is only a little better or if it comes back, your doctor may recommend another injection in a few weeks. If your pain has not changed, talk to your doctor about other treatment choices. Follow-up care is a key part of your treatment and safety. Be sure to make and go to all appointments, and call your doctor if you are having problems. It's also a good idea to know your test results and keep a list of the medicines you take. Where can you learn more? Go to http://www.gray.com/  Enter H162 in the search box to learn more about \"Learning About Lumbar Epidural Steroid Injections. \"  Current as of: July 1, 2021               Content Version: 13.0  © 3086-6067 [employer].    Care instructions adapted under license by [employer] (which disclaims liability or warranty for this information). If you have questions about a medical condition or this instruction, always ask your healthcare professional. [place] [id number] any warranty or liability for your use of this information.

## 2022-01-11 ENCOUNTER — HOSPITAL ENCOUNTER (OUTPATIENT)
Age: 67
Setting detail: OUTPATIENT SURGERY
Discharge: HOME | End: 2022-01-11
Attending: PHYSICAL MEDICINE & REHABILITATION | Admitting: PHYSICAL MEDICINE & REHABILITATION
Payer: MEDICARE

## 2022-01-11 ENCOUNTER — APPOINTMENT (OUTPATIENT)
Dept: GENERAL RADIOLOGY | Age: 67
End: 2022-01-11
Attending: PHYSICAL MEDICINE & REHABILITATION
Payer: MEDICARE

## 2022-01-11 VITALS
RESPIRATION RATE: 14 BRPM | HEART RATE: 57 BPM | DIASTOLIC BLOOD PRESSURE: 81 MMHG | SYSTOLIC BLOOD PRESSURE: 126 MMHG | TEMPERATURE: 98.2 F | OXYGEN SATURATION: 95 %

## 2022-01-11 LAB — GLUCOSE BLD STRIP.AUTO-MCNC: 96 MG/DL (ref 70–110)

## 2022-01-11 PROCEDURE — 74011250637 HC RX REV CODE- 250/637: Performed by: PHYSICAL MEDICINE & REHABILITATION

## 2022-01-11 PROCEDURE — 77030003669 HC NDL SPN COOK -B: Performed by: PHYSICAL MEDICINE & REHABILITATION

## 2022-01-11 PROCEDURE — 74011000250 HC RX REV CODE- 250: Performed by: PHYSICAL MEDICINE & REHABILITATION

## 2022-01-11 PROCEDURE — 64483 NJX AA&/STRD TFRM EPI L/S 1: CPT | Performed by: PHYSICAL MEDICINE & REHABILITATION

## 2022-01-11 PROCEDURE — 76010000009 HC PAIN MGT 0 TO 30 MIN PROC: Performed by: PHYSICAL MEDICINE & REHABILITATION

## 2022-01-11 PROCEDURE — 74011250636 HC RX REV CODE- 250/636: Performed by: PHYSICAL MEDICINE & REHABILITATION

## 2022-01-11 PROCEDURE — 2709999900 HC NON-CHARGEABLE SUPPLY: Performed by: PHYSICAL MEDICINE & REHABILITATION

## 2022-01-11 PROCEDURE — 77030003672 HC NDL SPN HALY -A: Performed by: PHYSICAL MEDICINE & REHABILITATION

## 2022-01-11 PROCEDURE — 82962 GLUCOSE BLOOD TEST: CPT

## 2022-01-11 PROCEDURE — 77030039433 HC TY MYLEOGRAM BD -B: Performed by: PHYSICAL MEDICINE & REHABILITATION

## 2022-01-11 PROCEDURE — 74011000636 HC RX REV CODE- 636: Performed by: PHYSICAL MEDICINE & REHABILITATION

## 2022-01-11 RX ORDER — DEXAMETHASONE SODIUM PHOSPHATE 100 MG/10ML
INJECTION INTRAMUSCULAR; INTRAVENOUS AS NEEDED
Status: DISCONTINUED | OUTPATIENT
Start: 2022-01-11 | End: 2022-01-11 | Stop reason: HOSPADM

## 2022-01-11 RX ORDER — LIDOCAINE HYDROCHLORIDE 10 MG/ML
INJECTION, SOLUTION EPIDURAL; INFILTRATION; INTRACAUDAL; PERINEURAL AS NEEDED
Status: DISCONTINUED | OUTPATIENT
Start: 2022-01-11 | End: 2022-01-11 | Stop reason: HOSPADM

## 2022-01-11 RX ORDER — DIAZEPAM 5 MG/1
5-20 TABLET ORAL ONCE
Status: COMPLETED | OUTPATIENT
Start: 2022-01-11 | End: 2022-01-11

## 2022-01-11 RX ADMIN — DIAZEPAM 10 MG: 5 TABLET ORAL at 09:12

## 2022-01-11 NOTE — PROCEDURES
SELECTIVE NERVE ROOT BLOCK PROCEDURE NOTE      Patient Name: Amelie Beltran  Date of Procedure: January 11, 2022  Preoperative Diagnosis:  Lumbar radiculopathy [M54.16]  Post Operative Diagnosis:  Lumbar radiculopathy [M54.16]  Location:  [place], [place]    Procedure :    bilateral  L5 Selective Nerve Root Block      Consent:  Informed consent was obtained prior to the procedure. The patient was given the opportunity to ask questions regarding the procedure and its associated risks. In addition to the potential risks associated with the procedure itself, the patient was informed both verbally and in writing of the potential side effects of the use of glucocorticoid. The patient appeared to comprehend the informed consent and desired to have the procedure performed. Procedure: The patient was placed in the prone position on the fluoroscopy table and the back was prepped and draped in the usual sterile manner. The exact spinal level was  identified using fluoroscopy, and Lidocaine 1 % was injected locally, a 22 gauge spinal needle was passed to the transverse process. The depth was noted and the needle redirected to pass inferior and approximately one cm anterior to the transverse process. YES  1 cc of Isovue M-200 was used to verify positioning in the epidural and paravertebral space and outlined the course of the spinal nerve into the epidural space. The same procedure was repeated at each spinal level indicated above. No vascular uptake was identified. A total of 10 mg of preservative free Dexamethasone and 1 cc of Lidocaine/site was slowly injected. The patient tolerated the procedure well. The injection area was cleaned and bandaids applied. Not excessive bleeding was noted. Patient dressed and discharged to home with instructions. Discussion: The patient tolerated the procedure well.  Patient reported anna-procedural pain on Visual Analog Scale: pre-5; post-0.                                               Estephania Larson MD  January 11, 2022

## 2022-01-11 NOTE — PERIOP NOTES
Patient tolerated procedure well. No complications noted. VSS. No redness, swelling, or bleeding from injection site. Dressing dry and intact. Armband removed and shredded. Patient wheeled to main entrance by CNA and discharged alive and well, in stable condition.

## 2022-01-11 NOTE — INTERVAL H&P NOTE
Update History & Physical    The Patient's History and Physical of January 6, 2022 was reviewed. There was no change. The surgical site was confirmed by the patient and me. Plan:  The risk, benefits, expected outcome, and alternative to the recommended procedure have been discussed with the patient. Patient understands and wants to proceed with the procedure.     Electronically signed by Latanya Winn MD on 1/11/2022 at 10:07 AM

## 2022-01-11 NOTE — DISCHARGE INSTRUCTIONS
[facility]   (DAVIDE)  Dr. Zully Andrew, Dr. Joey Reaves, Dr. Rudolph Mcdaniels Spinal Procedure (Block) Instructions    * Do not drive a car, operate heavy machinery or dangerous equipment, or make important decisions for 12-24 hours. * Light activity as tolerated; may rest for the remainder of the day. * Resume pre-block medications including those from your other doctors. * Do not drink alcoholic beverages for 24 hours. Alcohol and the medications you have received may interact and cause an adverse reaction. * You may feel better this evening and worse tomorrow, as the numbing medications wears off and the steroid has yet to begin to work. After 48-72 hrs the steroid should begin to release bringing you relief. If you had a medial branch block, no steroids were used. The medial branch block is a test to see if you are a candidate for radiofrequency ablation (RFA). The anesthetic (numbing medicine)  will wear off by the next day. * You may shower this evening and remove any bandages. * Avoid hot tubs/pools/tub soaks and heating pads for 24 hours. You may use cold packs on the procedure site as tolerated for the first 24 hours. * If a headache develops, drink plenty of fluids and rest.  Take over the counter medications for headache if needed. If the headache continues longer than 24 hours, call MD at the [address] [place] [address] Avenue. [phone]    * Continue taking pain medications as needed. * You may resume your regular diet if tolerated. Otherwise, start with sips of water and advance slowly. * If Diabetic: check your blood sugar three times a day for the next 3 days. If your sugar is greater than 300 call your family doctor. If your sugar is greater than 400, have someone transport you to the nearest Emergency Room. * If you experience any of the following problems, Please Call the [address] [place] [address] Avenue at [phone].         * Excessive pain, swelling, redness or odor at or around the surgical area    * Fever of 101 or higher    * Nausea / Vomiting lasting longer than 4 hours or if unable to take medications. * Severe Headache    * Weakness or numbness in arms or legs that is not      resolving   * Any NEW signs of decreased circulation or nerve impairment in leg: change in color, swelling, persistent numbness, tingling                    * Prolonged increase in pain greater than 4 days      PATIENT INSTRUCTIONS:    After oral sedation, for 12-24 hours or while taking prescription Narcotics:  · Limit your activities  · Do not drive and operate hazardous machinery  · Do not make important personal or business decisions  · Do  not drink alcoholic beverages  · If you have not urinated within 8 hours after discharge, please contact your surgeon on call. *  Please give a list of your current medications to your Primary Care Provider. *  Please update this list whenever your medications are discontinued, doses are      changed, or new medications (including over-the-counter products) are added. *  Please carry medication information at all times in case of emergency situations. These are general instructions for a healthy lifestyle:    No smoking/ No tobacco products/ Avoid exposure to second hand smoke    Surgeon General's Warning:  Quitting smoking now greatly reduces serious risk to your health. Obesity, smoking, and sedentary lifestyle greatly increases your risk for illness    A healthy diet, regular physical exercise & weight monitoring are important for maintaining a healthy lifestyle    You may be retaining fluid if you have a history of heart failure or if you experience any of the following symptoms:  Weight gain of 3 pounds or more overnight or 5 pounds in a week, increased swelling in our hands or feet or shortness of breath while lying flat in bed.   Please call your doctor as soon as you notice any of these symptoms; do not wait until your next office visit. Recognize signs and symptoms of STROKE:    F-face looks uneven    A-arms unable to move or move unevenly    S-speech slurred or non-existent    T-time-call 911 as soon as signs and symptoms begin-DO NOT go       Back to bed or wait to see if you get better-TIME IS BRAIN.

## 2022-02-10 ENCOUNTER — VIRTUAL VISIT (OUTPATIENT)
Dept: ORTHOPEDIC SURGERY | Age: 67
End: 2022-02-10
Payer: MEDICARE

## 2022-02-10 DIAGNOSIS — M47.816 LUMBAR FACET ARTHROPATHY: ICD-10-CM

## 2022-02-10 DIAGNOSIS — M54.16 LUMBAR NEURITIS: ICD-10-CM

## 2022-02-10 DIAGNOSIS — G89.29 CHRONIC PRIMARY MUSCULOSKELETAL PAIN: Primary | ICD-10-CM

## 2022-02-10 DIAGNOSIS — M54.16 LUMBAR RADICULOPATHY: ICD-10-CM

## 2022-02-10 DIAGNOSIS — M79.18 CHRONIC PRIMARY MUSCULOSKELETAL PAIN: Primary | ICD-10-CM

## 2022-02-10 DIAGNOSIS — M54.59 MECHANICAL LOW BACK PAIN: ICD-10-CM

## 2022-02-10 DIAGNOSIS — M79.18 MYOFASCIAL PAIN: ICD-10-CM

## 2022-02-10 PROCEDURE — 3017F COLORECTAL CA SCREEN DOC REV: CPT | Performed by: PHYSICAL MEDICINE & REHABILITATION

## 2022-02-10 PROCEDURE — G8427 DOCREV CUR MEDS BY ELIG CLIN: HCPCS | Performed by: PHYSICAL MEDICINE & REHABILITATION

## 2022-02-10 PROCEDURE — 99213 OFFICE O/P EST LOW 20 MIN: CPT | Performed by: PHYSICAL MEDICINE & REHABILITATION

## 2022-02-10 PROCEDURE — G8419 CALC BMI OUT NRM PARAM NOF/U: HCPCS | Performed by: PHYSICAL MEDICINE & REHABILITATION

## 2022-02-10 PROCEDURE — G8432 DEP SCR NOT DOC, RNG: HCPCS | Performed by: PHYSICAL MEDICINE & REHABILITATION

## 2022-02-10 NOTE — PROGRESS NOTES
Chief Complaint   Patient presents with    Follow-up       Pt preferred language for health care discussion is english. Is someone accompanying this pt? no    Is the patient using any DME equipment during [address]? no    Depression Screening:  3 most recent PHQ Screens 11/18/2021   Little interest or pleasure in doing things Not at all   Feeling down, depressed, irritable, or hopeless Not at all   Total Score PHQ 2 0       Learning Assessment:  No flowsheet data found. Health Maintenance reviewed and discussed per provider. Yes        Advance Directive:  1. Do you have an advance directive in place? Patient Reply:no    2. If not, would you like material regarding how to put one in place? Patient Reply: no    Coordination of Care:  1. Have you been to the ER, urgent care clinic since your last visit? Hospitalized since your last visit? no    2. Have you seen or consulted any other health care providers outside of the [address] [place] [address] since your last visit? Include any pap smears or colon screening.  no

## 2022-02-10 NOTE — PATIENT INSTRUCTIONS
Learning About Medial Branch Block and Neurotomy  What are medial branch block and neurotomy? Facet joints connect your vertebrae to each other. Problems in these joints can cause chronic (long-term) pain in the neck or back. Medial branch nerves are the nerves that carry many of the pain messages from your facet joints. Radiofrequency medial branch neurotomy is a type of medial branch neurotomy that is used to relieve arthritis pain. It uses radio waves to damage nerves in your neck or back so that they can no longer send pain messages to your brain. Before your doctor knows if a neurotomy will help you, you will get a medial branch block to find out if certain nerves are the ones that are a source of your pain. You will need two separate visits to the outpatient center or hospital to have both procedures. You will need someone to drive you home. How is a medial branch block done? The doctor will use a tiny needle to numb the skin where you will get the block. Then the doctor puts the block needle into the numbed area. You may feel some pressure, but you should not feel pain. Using fluoroscopy (live X-ray) to guide the needle, the doctor injects medicine onto one or more nerves to make them numb. If you get relief from your pain in the next 4 to 6 hours, it's a sign that those nerves may be contributing to your pain. The relief will last only a short time. You may then have a medial branch neurotomy at a later visit to try to get longer relief. How is a medial branch neurotomy done? The doctor will use a tiny needle to numb the skin where you will get the neurotomy. Then the doctor puts the neurotomy needle into the numbed area. You may feel some pressure. Using fluoroscopy (live X-ray) to guide the needle, the doctor sends radio waves through the needle to the nerve for 60 to 90 seconds. The radio waves heat the nerve, which damages it. The doctor may do this several times.  And more than one nerve may be treated. How long do medial branch block and neurotomy take? It takes 20 to 30 minutes to get the block. You can go home after the doctor watches you for about an hour. It takes 45 to 90 minutes to get a neurotomy, depending on how many nerves are heated. You will probably go home 30 to 60 minutes after the procedure. What can you expect after a neurotomy? You will get instructions on how to report how much pain you have when you are at home. You may feel a little sore or tender at the injection site at first. But after a successful neurotomy, most people have pain relief right away. It often lasts for several months, but your pain may come back. If your pain does come back, it may mean that the damaged nerve has healed and can send pain messages again. Or it can mean that a different nerve is causing pain. Your doctor will discuss your options with you. Follow-up care is a key part of your treatment and safety. Be sure to make and go to all appointments, and call your doctor if you are having problems. It's also a good idea to know your test results and keep a list of the medicines you take. Where can you learn more? Go to http://www.gray.com/  Enter T494 in the search box to learn more about \"Learning About Medial Branch Block and Neurotomy. \"  Current as of: April 8, 2021               Content Version: 13.0  © 7219-0193 [employer]. Care instructions adapted under license by [employer] (which disclaims liability or warranty for this information). If you have questions about a medical condition or this instruction, always ask your healthcare professional. [place] [address] any warranty or liability for your use of this information.

## 2022-02-10 NOTE — PROGRESS NOTES
[address]  [address], [address]  [facility], [address]  Phone: [phone]  Fax: [phone]        Celia Lyle  : 1955  PCP: Jack Couch MD  2/10/2022    PROGRESS NOTE    Consent: Nanci Gonzalez was evaluated through  a synchronous (real-time) audio-video encounter. The patient (or guardian if applicable) is aware that this is a billable service, which includes applicable co-pays. This Virtual Visit was conducted with patient's (and/or legal guardian's) consent. This visit was conducted pursuant to the emergency declaration under the [address] [place] [address] authority and the [employer] Act. Patient identification was verified, and a caregiver was present when appropriate. The patient was located in a state where the provider was licensed to provide care. The visit was conducted in the office with the patient being in home through a Telephone platform. Visit time start: 1:38 PM end: 1:50 PM      HISTORY OF PRESENT ILLNESS  Nanci Gonzalez is a 61 y.o. male who was seen as a new patient 3/10/2020 with c/o chronic back pain (R>L) since a work injury in 2018 with episodic pain radiating into the RLE. He was under the care of Dr. Ruelas during this time. He is no longer covered under  for this injury. Pt notes that he last attended PT a few months ago with temporary relief. Pt reports difficulty getting out of bed, walking, or bending over to pick something up off the floor. He was seen by Sports Medicine who received injections and RFA in the cervical region that provided some relief. Pt notes that he also has a sore on his back from popping a blackhead. I advised he consult his PCP for this. Pt notes that he is trying to obtain disability as his pain is functionally disabling.  He has been taking Gabapentin and muscle relaxants without significant benefit. Cervical spine MRI dated 3/14/19 reviewed. Per report, Minimal C4-5, C5-6, C6-7 central stenosis from minimal to mild disc bulging and spondylosis. Ventral cord contact with minimal flattening deformity, C3-4, C4-5, C5-C6 but no abnormal cord signal. Mid cervical straightening contributing to C3-4 level cord contact. Multilevel and bilateral foraminal stenosis, greatest and moderate to severe bilaterally at C4-5, C5-6, left C6-7 with impingement on bilateral C5, C6 and left C7 exiting nerve roots. Thoracic spine MRI dated 3/14/19 reviewed. Per report, Unremarkable MR thoracic spine. Incidental nonspinal findings as above. Lumbar spine MRI dated 10/26/18 reviewed. Per report, Multilevel spondylosis and foraminal narrowing without limiting central stenosis. L5/S1 and slight L3/4 annular tear which can be a pain generator. Feathery hyperintensity which can reflect edema/myositis in the paraspinal back musculature. Heterogeneously hyperintense signal in distal cord (T11 level), at the edge of the field-of-view of imaging, typically artifactual and not confirmed on sagittal T1-weighted imaging. If clinical findings are suspicious for thoracic cord findings, then dedicated thoracic spine MRI could best further assess. He continued to have low back pain radiating into the posterior LLE to the calf that was worse with sitting, especially riding in the car. He had pain with transitional movements. He has not had any treatment since his last OV because he just received insurance coverage. He has been maintaining a consistent HEP. He occasionally takes Ibuprofen. He has been taking Baclofen, but his wife notes it seems to increase his blood pressure. He went to [email or web address] [address] about 50% improvement. He notes that his back is weak, but his pain tends to be episodic. It tends to be worse at the end of the day. He has some pain in the morning until he gets going.  He found some benefit with Tizanidine prescribed by another provider. who was seen as a new patient 3/10/2020 with c/o chronic back pain (R>L) since a work injury in October 2018 with episodic pain radiating into the RLE. He was under the care of Dr. Salley Galeazzi during this time. He is no longer covered under  for this injury. Pt notes that he last attended PT a few months ago with temporary relief. Pt reports difficulty getting out of bed, walking, or bending over to pick something up off the floor. He was seen by Sports Medicine who received injections and RFA in the cervical region that provided some relief. Pt notes that he also has a sore on his back from popping a blackhead. I advised he consult his PCP for this. Pt notes that he is trying to obtain disability as his pain is functionally disabling. He has been taking Gabapentin and muscle relaxants without significant benefit. Cervical spine MRI dated 3/14/19 reviewed. Per report, Minimal C4-5, C5-6, C6-7 central stenosis from minimal to mild disc bulging and spondylosis. Ventral cord contact with minimal flattening deformity, C3-4, C4-5, C5-C6 but no abnormal cord signal. Mid cervical straightening contributing to C3-4 level cord contact. Multilevel and bilateral foraminal stenosis, greatest and moderate to severe bilaterally at C4-5, C5-6, left C6-7 with impingement on bilateral C5, C6 and left C7 exiting nerve roots. Thoracic spine MRI dated 3/14/19 reviewed. Per report, Unremarkable MR thoracic spine. Incidental nonspinal findings as above. Lumbar spine MRI dated 10/26/18 reviewed. Per report, Multilevel spondylosis and foraminal narrowing without limiting central stenosis. L5/S1 and slight L3/4 annular tear which can be a pain generator. Feathery hyperintensity which can reflect edema/myositis in the paraspinal back musculature.  Heterogeneously hyperintense signal in distal cord (T11 level), at the edge of the field-of-view of imaging, typically artifactual and not confirmed on sagittal T1-weighted imaging. If clinical findings are suspicious for thoracic cord findings, then dedicated thoracic spine MRI could best further assess. He continued to have low back pain radiating into the posterior LLE to the calf that was worse with sitting, especially riding in the car. He had pain with transitional movements. He has not had any treatment since his last OV because he just received insurance coverage. He has been maintaining a consistent HEP. He occasionally takes Ibuprofen. He has been taking Baclofen, but his wife notes it seems to increase his blood pressure. He went to [email or web address] [address] about 50% improvement. He notes that his back is weak, but his pain tends to be episodic. It tends to be worse at the end of the day. He has some pain in the morning until he gets going. He found some benefit with Tizanidine prescribed by another provider. Mack Lyle is seen virtually for f/u. He underwent bilateral L5 TFESI (1/11/22; Dr. Peg Salazar) with greater than 50% improvement. He notes improvement in his flexibility and ability to perform household tasks. However, he notes that he is fine when he is inside, but as soon as he goes into the cold weather, he has more pain. He is interested in treatment for arthritis. Pain Scale: 5/10    Treatments patient has tried:  Physical therapy: Yes  Doing HEP: Yes  Non-opioid medications: Yes- Ibuprofen (PRN), baclofen, Zanaflex, Amitriptyline, Gabapentin   Spinal injections: No  Spinal surgery- No.   Last Lumbar MRI: 2021     PmHx: acid reflux    ASSESSMENT  Mack Lyle is a 61 y.o. male with c/o chronic low back pain radiating into the LLE and occasionally into the RLE. His symptoms are likely due to myofascial pain and a potential radiculopathy. He is neurologically intact. He has responded well to PT and bilateral L5 TFESI. PLAN  1. Referral to Dr. Peg Salazar for MBB/RFA. Pt will f/u with Dr. Peg Salazar for MBB/RFA.       Diagnoses and all orders for this visit:    1. Chronic primary musculoskeletal pain    2. Mechanical low back pain    3. Myofascial pain    4. Lumbar neuritis    5. Lumbar radiculopathy    6. Lumbar facet arthropathy         PAST MEDICAL HISTORY   No past medical history on file. No past surgical history on file. Korey Reid MEDICATIONS      Current Outpatient Medications   Medication Sig Dispense Refill    ibuprofen (MOTRIN) 800 mg tablet Take  by mouth.  gabapentin (NEURONTIN) 300 mg capsule Take 2 Capsules by mouth three (3) times daily. Max Daily Amount: 1,800 mg. 180 Capsule 2    diazePAM (Valium) 5 mg tablet Take 1 tab 30 mins prior to MRI, take 1 tab immediate prior to MRI  Indications: inducing of a relaxed easy state 2 Tablet 0    tiZANidine (ZANAFLEX) 2 mg tablet TAKE 1 TABLET BY MOUTH TWICE DAILY AS NEEDED FOR SPASMS      amitriptyline (ELAVIL) 25 mg tablet nightly.  omeprazole (PRILOSEC) 10 mg capsule Take 10 mg by mouth daily.  naproxen sodium (ALEVE) 220 mg cap Take  by mouth.  acetaminophen (TYLENOL) 325 mg tablet Take 800 mg by mouth three (3) times daily.  predniSONE (STERAPRED DS) 10 mg dose pack See administration instruction per 10mg dose pack (Patient not taking: Reported on 2/10/2022) 21 Tablet 0    baclofen (LIORESAL) 10 mg tablet three (3) times daily. (Patient not taking: Reported on 2/10/2022)      meloxicam (MOBIC) 15 mg tablet Take 1 Tab by mouth daily as needed for Pain. (Patient not taking: Reported on 2/10/2022) 30 Tab 1        ALLERGIES  No Known Allergies       SOCIAL HISTORY    Social History     Socioeconomic History    Marital status:    Tobacco Use    Smoking status: Never Smoker    Smokeless tobacco: Never Used   Vaping Use    Vaping Use: Never used   Substance and Sexual Activity    Alcohol use: No    Drug use: No       FAMILY HISTORY  No family history on file.       REVIEW OF SYSTEMS  Review of Systems   Constitutional: Negative for chills, fever and weight loss. Respiratory: Negative for shortness of breath. Cardiovascular: Negative for chest pain. Gastrointestinal: Negative for constipation. Negative for fecal incontinence    Genitourinary: Negative for dysuria. Negative for urinary incontinence   Musculoskeletal: Positive for back pain. BLE pain, L>>R   Skin: Negative for rash. Neurological: Negative for dizziness, tingling, tremors, focal weakness and headaches. Endo/Heme/Allergies: Does not bruise/bleed easily. Psychiatric/Behavioral: The patient does not have insomnia. PHYSICAL EXAMINATION    Pain Assessment  2/10/2022   Location of Pain Back   Location Modifiers -   Severity of Pain 5   Quality of Pain Aching   Quality of Pain Comment -   Duration of Pain A few minutes   Duration of Pain Comment -   Frequency of Pain Several times daily   Frequency of Pain Comment -   Date Pain First Started (No Data)   Date Pain First Started Comment 2019   Aggravating Factors (No Data)   Aggravating Factors Comment sitting long time   Limiting Behavior Yes   Relieving Factors Rest;Exercises   Result of Injury No   Work-Related Injury -   How long out of work? -   Type of Injury -   Type of Injury Comment -       The limitations of a telemedicine visit including the inability to perform a detailed physical examination may miss significant findings was presented to the patient, and the patient still elected to proceed with the telemedicine visit.     RADIOGRAPHS  Lumbar MRI images taken on 11/28/21 personally reviewed with patient:  Alignment: Intact lordosis  Vertebral body height: Normal  Marrow signal: Unremarkable  Disc spaces: Preserved height and signal intensity  Conus: Terminates at T12-L1     Axial imaging correlation:     L1-2: Patent canal and foramina.     L2-3: Mild signal loss narrowing of the disc space noted there is a central  protrusion-type small herniation present     Central canal is nonstenotic     Mild arthritic changes identified within the facets     Bulge in the right neuroforamina exiting nerve root not compressed     Similar bulge into the left neuroforamina exiting nerve root not compressed     L3-4: Moderate narrowing mild signal loss at the L3-4 level noted     Central canal is nonstenotic  Facets demonstrate hypertrophic changes     Exiting right L3 root not compressed     Small amount of protrusion herniation osteophyte projecting into the left  neuroforamina exiting left L3 root is not compressed     L4-5: Moderate intradiscal degenerative changes are present there is a mild  protrusion-type bulge across the midline     Central canal is nonstenotic     Right neuroforamina demonstrates exiting L4 root not compressed     Left L4 root not compressed     Facets demonstrate moderate hypertrophic changes     L5-S1: Moderate intradiscal degenerative changes present anterior osteophytic  changes small amount present     There is a central protrusion-type herniation moderate size extends into the  neuroforamina     Central canal is nonstenotic     Right neuroforamina exiting nerve root is not compressed     Left neuroforamina exiting nerve root is not compressed     Facets demonstrate mild arthritic and hypertrophic changes     Other structures: Benign upper pole cysts identified in the right kidney maximum  dimension 1.5 cm no features of malignancy portions the adrenal glands included  are clear     Aorta nonaneurysmal no adenopathy        IMPRESSION     Comparing to the report submitted in the study notes     Central moderate sized L5 herniation slightly more right-sided may be causing  intermittent right S1 nerve root irritation     Facet arthropathy is identified from L3 through S1     No central canal stenosis     Incidental encroachment into the neuroforamina on the right and left side  exiting nerve roots do not appear to be compressed     Lumbar XR images taken on 3/10/2020 personally reviewed with patient:  Spondylitic changes  Significant lean to the R  Disc space narrowing at L4-5 and L5-S1  Facet sclerosis  Potential Baastrup's disease  Normal alignment  No obvious compression deformities    Cervical MRI images taken on 3/14/19 personally reviewed with patient:  Mild mid cervical straightening with loss of upper cervical lordosis but no listhesis    Postop:   None     At C2-3:  Unremarkable disc and facet joints.      At C3-4:  Minimal loss of disc height with mild disc bulge and minimal spondylosis. Low normal canal caliber; no central stenosis. Contact and minimal flattening of the ventral cord due to alignment versus cord difference but no abnormal cord signal. Unremarkable facet joints. No foraminal stenosis      At C4-5:  Mild loss of disc height with mild disc bulge and minimal spondylosis. Minimal central stenosis. Contact and minimal flattening of the ventral cord in part due to alignment versus cord difference but no abnormal cord signal. Unremarkable facet joints. Severe left and moderate right foraminal stenosis from uncovertebral bony hypertrophy with impingement on both exiting C5 nerve roots. At C5-6:  Mild loss of disc height with mild disc bulge and minimal spondylosis. Minimal central stenosis. Contact and minimal flattening of the ventral cord in part due to alignment versus cord difference but no abnormal cord signal. Unremarkable facet joints. Severe left and right foraminal stenosis from uncovertebral bony hypertrophy with impingement on both exiting C6 nerve roots. At C6-7:  Mild loss of disc height with mild minimally asymmetric left greater than right paracentral disc bulge and minimal spondylosis. Minimal central stenosis. Contact and minimal flattening of the ventral cord in part due to alignment versus cord difference but no abnormal cord signal. Unremarkable facet joints.  Severe left and mild right foraminal stenosis from uncovertebral bony hypertrophy with impingement on left exiting C7 nerve root. At C7-T1:  Unremarkable disc and facet joints. Unremarkable upper thoracic discs as included    CERVICAL CORD:  Cervical cord is of otherwise normal caliber and signal.     MISC:  Marrow signal is primarily red marrow, unremarkable for age and gender. Craniocervical junction is within normal limits. Atlantoaxial joint is minimally osteoarthritic    Paraspinal musculature is unremarkable in caliber and signal.     L5-S1 spondylosis on  localizer,      IMPRESSION    1.  Minimal C4-5, C5-6, C6-7 central stenosis from minimal to mild disc bulging and spondylosis. Ventral cord contact with minimal flattening deformity, C3-4, C4-5, C5-C6 but no abnormal cord signal. Mid cervical straightening contributing to C3-4 level cord contact. 2. Multilevel and bilateral foraminal stenosis, greatest and moderate to severe bilaterally at C4-5, C5-6, left C6-7 with impingement on bilateral C5, C6 and left C7 exiting nerve roots.     Thoracic MRI images taken on 3/14/19 personally reviewed with patient:  Alignment is anatomic.      DISC LEVELS:  Unremarkable discs and facet joints. Minimal T11-12 foraminal stenosis from facet arthritis. THORACIC CORD:  Thoracic cord is of normal caliber and signal. Top normal caliber epidural fat but no canal stenosis    MISC:  Focal round 1 cm hemangioma, T2 vertebral body Marrow signal is otherwise unremarkable for age and gender. Paraspinal musculature is unremarkable in caliber and signal.     Probable small 2 cm round fluid signal lesion in the right lobe liver and 1 cm round fluid signal lesion in the right kidney, both incompletely included visible on only 2 images, indeterminate but most likely benign most likely small cysts, possibly hepatic hemangioma and renal cyst. Curvilinear intermediate signal both lungs, indeterminate also most likely partial atelectasis or scarring. IMPRESSION    1.  Unremarkable MR thoracic spine    2.  Incidental nonspinal findings as above.     Lumbar MRI images taken on 10/26/18 personally reviewed with patient:  The conus terminates at the L1 level, without contour abnormalities. There is heterogeneous hyperintense signal in the distal cord, on T2 and STIR imaging, not confirmed on T1. Axial imaging is not obtained through this level. Marrow/osseous: No T1 or STIR signal abnormalities to suggest acute facet, marrow, edema or focal lesion.       T12/L1: No significant disc protrusion or limiting stenosis is seen. L1/2: No significant disc protrusion or limiting stenosis is seen. L2/3: Mild disc protrusion, mild ventral impression on thecal sac without limiting central or foraminal stenosis. Mild facet arthropathy. L3/4: Mild disc protrusion, mild ventral impression on thecal sac, facet and ligamentous hypertrophy mildly flattening the dorsal lateral thecal sac. Slight T2 hyperintensity suggests annular tear. Mild to moderate left more than right inferior foraminal narrowing. Borderline central stenosis. L4/5: Disc space narrowing, mild to moderate inferior foraminal narrowing secondary to disc/osteophyte, facet and ligamentous hypertrophy. Mild dorsolateral impression on the thecal sac. No limiting central stenosis. L5/S1: Central and foraminal disc/osteophyte, annular tear, mild ventral contact of the thecal sac and descending S1 roots, and right more than left inferior foraminal narrowing, slightly contact the undersurface of the foraminal right L5 nerve root. Facet and ligamentous hypertrophy. Other: Feathery hyperintensity in the posterior paraspinal back musculature. IMPRESSION      1. Multilevel spondylosis and foraminal narrowing without limiting central stenosis. L5/S1 and slight L3/4 annular tear which can be a pain generator. 2. Feathery hyperintensity which can reflect edema/myositis in the paraspinal back musculature.   3. Heterogeneously hyperintense signal in distal cord (T11 level), at the edge of the field-of-view of imaging, typically artifactual and not confirmed on sagittal T1-weighted imaging. If clinical findings are suspicious for thoracic cord findings, then dedicated thoracic spine MRI could best further assess.  reviewed    Mr. Eva Olmos has a reminder for a \"due or due soon\" health maintenance. I have asked that he contact his primary care provider for follow-up on this health maintenance. Pursuant to the emergency declaration under the [address] [place] [address], [facility] waiver authority and the [employer] Act, this Virtual  Visit was conducted, with patient's consent, to reduce the patient's risk of exposure to COVID-19 and provide continuity of care for an established patient. Services were provided through a video synchronous discussion virtually to substitute for in-person clinic visit. CPT Codes 10127-22366 for Established Patients may apply to this Telehealth Visit  Time-based coding, delete if not needed: I spent at least 10 minutes with this established patient, and >50% of the time was spent counseling and/or coordinating care. Written by Eladia Henderson, as dictated by Dr. Moy Ruff.

## 2022-03-14 ENCOUNTER — OFFICE VISIT (OUTPATIENT)
Dept: ORTHOPEDIC SURGERY | Age: 67
End: 2022-03-14
Payer: MEDICARE

## 2022-03-14 VITALS
TEMPERATURE: 97.7 F | HEART RATE: 64 BPM | WEIGHT: 215 LBS | OXYGEN SATURATION: 99 % | HEIGHT: 72 IN | BODY MASS INDEX: 29.12 KG/M2

## 2022-03-14 DIAGNOSIS — R26.89 IMBALANCE: Primary | ICD-10-CM

## 2022-03-14 DIAGNOSIS — M54.16 LUMBAR NEURITIS: ICD-10-CM

## 2022-03-14 DIAGNOSIS — M47.816 LUMBAR FACET ARTHROPATHY: ICD-10-CM

## 2022-03-14 PROCEDURE — 99213 OFFICE O/P EST LOW 20 MIN: CPT | Performed by: PHYSICAL MEDICINE & REHABILITATION

## 2022-03-14 PROCEDURE — 3017F COLORECTAL CA SCREEN DOC REV: CPT | Performed by: PHYSICAL MEDICINE & REHABILITATION

## 2022-03-14 PROCEDURE — G8427 DOCREV CUR MEDS BY ELIG CLIN: HCPCS | Performed by: PHYSICAL MEDICINE & REHABILITATION

## 2022-03-14 PROCEDURE — G8432 DEP SCR NOT DOC, RNG: HCPCS | Performed by: PHYSICAL MEDICINE & REHABILITATION

## 2022-03-14 PROCEDURE — G8419 CALC BMI OUT NRM PARAM NOF/U: HCPCS | Performed by: PHYSICAL MEDICINE & REHABILITATION

## 2022-03-14 NOTE — LETTER
3/15/2022    Patient: Obdulia Rhoades   YOB: 1955   Date of Visit: 3/14/2022     Emma Robles MD  Cynthia Denise [address] [place], [place] [id number]  Via Fax: [phone]    Dear Emma Robles MD,      Thank you for referring Mr. Obdulia Rhoades to [address] [place] [address] for evaluation. My notes for this consultation are attached. If you have questions, please do not hesitate to call me. I look forward to following your patient along with you.       Sincerely,    Derrek Callejas MD

## 2022-03-14 NOTE — PROGRESS NOTES
Ricardo Quintero presents today for   Chief Complaint   Patient presents with    Back Pain       Is someone accompanying this pt? Yes, female    Is the patient using any DME equipment during OV? Yes, cane    Depression Screening:  3 most recent PHQ Screens 11/18/2021   Little interest or pleasure in doing things Not at all   Feeling down, depressed, irritable, or hopeless Not at all   Total Score PHQ 2 0       Learning Assessment:  Learning Assessment 3/14/2022   PRIMARY LEARNER Patient   PRIMARY LANGUAGE ENGLISH   LEARNER PREFERENCE PRIMARY LISTENING     READING   ANSWERED BY patient   RELATIONSHIP SELF       Abuse Screening:  Abuse Screening Questionnaire 3/14/2022   Do you ever feel afraid of your partner? N   Are you in a relationship with someone who physically or mentally threatens you? N   Is it safe for you to go home? Y       Fall Risk  Fall Risk Assessment, last 12 mths 3/14/2022   Able to walk? Yes   Fall in past 12 months? 0   Do you feel unsteady? 0   Are you worried about falling 0       Coordination of Care:  1. Have you been to the ER, urgent care clinic since your last visit? no  Hospitalized since your last visit? no    2. Have you seen or consulted any other health care providers outside of the [address] [place] [address] since your last visit? no Include any pap smears or colon screening.  no

## 2022-06-06 ENCOUNTER — OFFICE VISIT (OUTPATIENT)
Dept: ORTHOPEDIC SURGERY | Age: 67
End: 2022-06-06
Payer: MEDICARE

## 2022-06-06 VITALS
HEIGHT: 72 IN | OXYGEN SATURATION: 98 % | WEIGHT: 220 LBS | TEMPERATURE: 97.1 F | HEART RATE: 58 BPM | BODY MASS INDEX: 29.8 KG/M2

## 2022-06-06 DIAGNOSIS — R26.89 IMBALANCE: ICD-10-CM

## 2022-06-06 DIAGNOSIS — M54.16 LUMBAR RADICULOPATHY: Primary | ICD-10-CM

## 2022-06-06 DIAGNOSIS — M47.816 LUMBAR FACET ARTHROPATHY: ICD-10-CM

## 2022-06-06 PROCEDURE — 1123F ACP DISCUSS/DSCN MKR DOCD: CPT | Performed by: PHYSICAL MEDICINE & REHABILITATION

## 2022-06-06 PROCEDURE — G8427 DOCREV CUR MEDS BY ELIG CLIN: HCPCS | Performed by: PHYSICAL MEDICINE & REHABILITATION

## 2022-06-06 PROCEDURE — G8536 NO DOC ELDER MAL SCRN: HCPCS | Performed by: PHYSICAL MEDICINE & REHABILITATION

## 2022-06-06 PROCEDURE — 99214 OFFICE O/P EST MOD 30 MIN: CPT | Performed by: PHYSICAL MEDICINE & REHABILITATION

## 2022-06-06 PROCEDURE — G8417 CALC BMI ABV UP PARAM F/U: HCPCS | Performed by: PHYSICAL MEDICINE & REHABILITATION

## 2022-06-06 PROCEDURE — 3017F COLORECTAL CA SCREEN DOC REV: CPT | Performed by: PHYSICAL MEDICINE & REHABILITATION

## 2022-06-06 PROCEDURE — G8432 DEP SCR NOT DOC, RNG: HCPCS | Performed by: PHYSICAL MEDICINE & REHABILITATION

## 2022-06-06 PROCEDURE — 1101F PT FALLS ASSESS-DOCD LE1/YR: CPT | Performed by: PHYSICAL MEDICINE & REHABILITATION

## 2022-06-06 RX ORDER — GABAPENTIN 300 MG/1
CAPSULE ORAL
Qty: 180 CAPSULE | Refills: 2 | Status: SHIPPED | OUTPATIENT
Start: 2022-06-06

## 2022-06-06 NOTE — PROGRESS NOTES
Robles Foley presents today for   Chief Complaint   Patient presents with    Back Pain       Is someone accompanying this pt? Yes, spouse    Is the patient using any DME equipment during [address]? Yes, cane    Depression Screening:  3 most recent PHQ Screens 11/18/2021   Little interest or pleasure in doing things Not at all   Feeling down, depressed, irritable, or hopeless Not at all   Total Score PHQ 2 0       Learning Assessment:  Learning Assessment 3/14/2022   PRIMARY LEARNER Patient   PRIMARY LANGUAGE ENGLISH   LEARNER PREFERENCE PRIMARY LISTENING     READING   ANSWERED BY patient   RELATIONSHIP SELF       Abuse Screening:  Abuse Screening Questionnaire 3/14/2022   Do you ever feel afraid of your partner? N   Are you in a relationship with someone who physically or mentally threatens you? N   Is it safe for you to go home? Y       Fall Risk  Fall Risk Assessment, last 12 mths 3/14/2022   Able to walk? Yes   Fall in past 12 months? 0   Do you feel unsteady? 0   Are you worried about falling 0       Coordination of Care:  1. Have you been to the ER, urgent care clinic since your last visit? no  Hospitalized since your last visit? no    2. Have you seen or consulted any other health care providers outside of the [address] [place] [address] since your last visit? Yes, neurology Include any pap smears or colon screening.  no

## 2022-06-06 NOTE — PROGRESS NOTES
[facility]  [address], [address]  [place], [address] [place] [address] Street  Phone: [phone]  Fax: [phone]        Patty Simon  : 1955  PCP: Cj Mott MD    PROGRESS NOTE      ASSESSMENT AND PLAN    Diagnoses and all orders for this visit:    1. Lumbar radiculopathy  -     gabapentin (NEURONTIN) 300 mg capsule; TAKE 2 CAPSULES BY MOUTH THREE TIMES DAILY. MAX DAILY AMOUNT: 1800 MG  -     SCHEDULE SURGERY    2. Lumbar facet arthropathy    3. Imbalance        1. Tyron Ch is a 74 y.o. male with right lumbar radiculopathy, back pain, and imbalance. Will obtain neurology evaluation. Addendum- notes received and reviewed. EMG/NCV findings below. 2. RF Gabapentin  3. Given instructions on ball exercises. Perform as tolerated. 4. Advised to continue HEP as tolerated. 5. Schedule B/L L5 SNRB  6. Avoid repetitive bending, lifting, and twisting    Follow-up and Dispositions    · Return in about 3 months (around 2022) for medication management. HISTORY OF PRESENT ILLNESS      Tyron Ch is a 74 y.o. male presents for follow up of back pain. He reports intermittent pain exacerbated with standing and walking. His leg pain is increased with prolonged sitting. Feels limited as far as walking in the parking lot. Feels unsteady    Reports that he saw Dr. Pavithra Young, neurologist and had an EMG. I do not have these results. He continues to take Gabapentin and Zanaflex with benefit. Zanaflex promotes mild somnolence.  He is compliant with his HEP    Pain Assessment  2022   Location of Pain Back   Location Modifiers -   Severity of Pain 5   Quality of Pain Other (Comment)   Quality of Pain Comment stiffness   Duration of Pain Persistent   Duration of Pain Comment -   Frequency of Pain Intermittent   Frequency of Pain Comment -   Date Pain First Started -   Date Pain First Started Comment -   Aggravating Factors Other (Comment) Aggravating Factors Comment standing too long   Limiting Behavior Yes   Relieving Factors Other (Comment)   Relieving Factors Comment sitting, lying down   Result of Injury No   Work-Related Injury -   How long out of work? -   Type of Injury -   Type of Injury Comment -       Onset: 2018, work injury     Investigations:   LE EMG/NCV 5/2022: Left chronic L5, BL chronic S1 radiculopathy  L MRI 2021: moderate FA L4-5, mild FA L5-S1, right L5-S1 HNP  C MRI 2019: Multilevel degenerative changes, mild stenosis  T MRI 2019: Benign  Spine surgery consult: none     Treatments:  Physical therapy: 1/2022 with temporary benefit  Spinal injections: 1/2022 B/L L5 ZOE with greater than 50% improvement,  unilateral lumbar RF 2019? with benefit WC injury. Spinal surgery- none  Beneficial medications: Gabapentin, Ibuprofen, Zanaflex  Failed medications: Baclofen (dizziness, sweats), Mobic      Work Status: on disability. Previously worked at [employer]. hx Worker's Comp. injury in 10/2018, treated by Dr. Oliverio Estes.   Pertinent PMHx:  GERD    PHYSICAL EXAMINATION    Visit Vitals  Pulse (!) 58 Comment: pt asymptomatic, MD aware   Temp 97.1 °F (36.2 °C) (Tympanic)   Ht 6' (1.829 m)   Wt 220 lb (99.8 kg)   SpO2 98% Comment: RA   BMI 29.84 kg/m²     Ambulation with walking stick  Hip hiking on the right  Negative Russo's  DTRs 1+ UE  LE strength intact while seated  Wide base of support  No clonus  Negative Connor's              Written by Sheryl Hensley, as dictated by Nena Cerna MD.

## 2022-06-06 NOTE — LETTER
6/6/2022    Patient: Fina Lazaro   YOB: 1955   Date of Visit: 6/6/2022     Simon Mckeon MD  Cynthia Denise [address] [place] [id number]  Via Fax: [phone]    Dear Simon Mckeon MD,      Thank you for referring Mr. Fina Lazaro to [facility] for evaluation. My notes for this consultation are attached. If you have questions, please do not hesitate to call me. I look forward to following your patient along with you.       Sincerely,    Samson Landers MD

## 2022-06-06 NOTE — PATIENT INSTRUCTIONS
Therapeutic Ball: Back Exercises  Introduction  Here are some examples of typical exercises for your condition. Start each exercise slowly. Ease off the exercise if you start to have pain. Your doctor or physical therapist will tell you when you can start these exercises and which ones will work best for you. To prepare, make sure that your ball is the right size for you. When inflated and firm, it should allow you to sit with your hips and knees bent at about a 90-degree angle (like the letter L). How to do the exercises  Seated position on ball    1. Use this exercise to get used to moving on the ball and to find your best sitting position. 2. Sit comfortably on the ball with your feet about hip-width apart. If you feel unsteady, rest your hands on the ball near your hips. 3. As you do this exercise, try to keep your shoulders and upper body relaxed and still. 4. Using your stomach and back muscles to move your pelvis, roll the ball forward. This will round your back. 5. Still using your stomach and back muscles, roll the ball back. You will arch your back. 6. Repeat this rounding-arching motion a few times. 7. Stop in between the two positions, where your back is not rounded or arched. This is called your neutral position. Pelvic rotation    1. Sit tall on the ball. 2. Slowly rotate your hips in a Lummi pattern. Keep the movement focused at your hips. 3. Repeat, but Lummi in the other direction. 4. Repeat 8 to 12 times. Postural sitting    1. Use this position to find a stable, relaxed posture on the ball. You can use this position as your starting point for other ball exercises. If you feel unsteady on the ball, start on a chair first.  2. Sit on a ball or chair, with your feet planted straight in front of you. 3. Imagine that a string at the top of your head is pulling you straight up. Think of yourself as 2 inches taller than you are.  4. Slightly tuck your chin.   5. Keep your shoulders back and relaxed. Knee extension    1. Sit tall on the ball with your feet planted in front of you, hip-width apart. As you do this exercise, avoid slumping your shoulders and arching your back. 2. Rest your hands on the ball near your hip or a steady object next to you. (If you feel very stable on the ball, rest your hands in your lap or at your side.)  3. Slowly straighten one leg at the knee. Slowly lower it back down. Repeat with the other leg. 4. Repeat this exercise 8 to 12 times. Roll-ups    1. Lie on your back with your knees bent, feet resting on the floor. 2. Lay the ball on your thighs. Rest your hands up high on the ball. 3. Raising your head and shoulder blades, roll the ball up your thighs. Exhale as you roll up. 4. If this is hard on your neck, gently support your lower head and upper neck with one hand. Don't use that hand to pull your head up. 5. Repeat 8 to 12 times. Ball curls    1. Lie on your back with your ankles resting on the ball, knees straight. 2. Use your legs to roll the exercise ball toward you. Allow your knees to bend and move closer to your chest.  3. Pause briefly, and then roll the ball to the starting position. Try to keep the ball rolling straight. You will feel the muscles in your lower belly working. 4. Repeat 8 to 12 times. Bridge with ball under legs    1. Lie on your back with your legs up, calves resting on the ball. For more challenge, rest your heels on the ball. 2. Look up at the ceiling, and keep your chin relaxed. You can place a small pillow under your head or neck for comfort. 3. With your arms by your side, press your hands onto the floor for stability. 4. Tighten your belly muscles by pulling in your belly button toward your spine. 5. Push your heels down toward the floor, squeeze your buttocks, and lift your hips off the floor until your shoulders, hips, and knees are all in a straight line. 6. Try to keep the ball steady.  Hold for about 6 seconds as you continue to breathe normally. 7. Slowly lower your hips back down to the floor. 8. Repeat 8 to 12 times. Ball curls with bridge    1. Start flat on your back with your ankles resting on the ball. 2. Look up at the ceiling, and keep your chin relaxed. You can place a small pillow under your head or neck for comfort. 3. With your arms by your side, press your hands onto the floor for stability. 4. Tighten your belly muscles by pulling in your belly button toward your spine. 5. Push your heels down toward the floor, squeeze your buttocks, and lift your hips off the floor until your shoulders, hips, and knees are all in a straight line. 6. While holding the bridge position, roll the ball toward you with your heels. Keep your hips as level as you can. 7. Pause briefly, and then roll the ball back out. Try to keep the ball rolling straight. You will feel the muscles in your lower belly working as you straighten your legs. 8. Lower your hips, and return to your starting position. 9. Repeat 8 to 12 times. 10. When you can keep your body and the ball steady throughout this exercise, you're ready for more challenge. Try keeping your hips raised while rolling the ball out, holding the bridge, and rolling back, a few times in a row. Praying marquis    1. Kneel upright with the ball in front of you. 2. To start, clasp your hands together. Rest them on the ball in front of you. 3. As you do this exercise, keep your back and hips straight and tighten your belly and buttocks muscles. Keep your knees in place. 4. Press on the ball with your arms. Lean forward from the knees. This rolls the ball forward. You will bear most of your weight on your arms. 5. If your back starts to ache, you've gone too far. Pull back a bit. 6. Roll back to the start position. 7. Repeat 8 to 12 times. Walk-out plank on ball    1. Kneel over the ball. Place your hands on the floor in front of you.   2. Walk your hands forward until your legs are straight on the ball. This is the plank position. 3. When in plank position, hold your body straight and tighten your belly and buttocks muscles. Keep your chin slightly tucked. 4. Roll as far forward as you can without losing your balance or letting your hips drop. You may stop with the ball under your thighs, or even under your knees or shins. 5. Hold a few seconds, then walk your hands back and return to the start position. 6. Repeat 8 to 12 times. Push-up with thighs on ball    1. Kneel over the ball. Place your hands on the floor in front of you. 2. Walk your hands forward until your legs are straight on the ball. This is the plank position. 3. When in plank position, hold your body straight and tighten your belly and buttocks muscles. Keep your chin slightly tucked. 4. Roll as far forward as you can without losing your balance or letting your hips drop. You may stop with the ball under your thighs, or even under your knees or shins. 5. Bend your elbows. Slowly lower your body toward the ground as far as you can without losing your balance. 6. If your wrists hurt, try moving your hands a little farther apart so they're not right under your shoulders. 7. Slowly straighten your arms. 8. Do 8 to 12 of these push-ups. Wall squat with ball    1. Stand facing away from a wall. Place your feet about shoulder-width apart. 2. Place the ball between your middle back and the wall. Move your feet out in front of you so they are about a foot in front of your hips. 3. Keep your arms at your sides, or put your hands on your hips. 4. Slowly squat down as if you are going to sit in a chair, rolling your back over the ball as you squat. The ball should move with you but stay pressed into the wall. 5. Be sure that your knees do not go in front of your toes as you squat. 6. Hold for 6 seconds. 7. Slowly rise to your standing position. 8. Repeat 8 to 12 times. Child's pose with ball    1.  Kneeling upright with your back straight, rest your hands on the ball in front of you. 2. Breathe out as you bend at the hips, and roll the ball forward. Lower your chest toward the ground, and drop your hips back toward your heels. 3. To stretch your upper back and shoulders, hold this position for 15 to 30 seconds. 4. Repeat 2 to 4 times. Follow-up care is a key part of your treatment and safety. Be sure to make and go to all appointments, and call your doctor if you are having problems. It's also a good idea to know your test results and keep a list of the medicines you take. Where can you learn more? Go to http://www.gray.com/  Enter E744 in the search box to learn more about \"Therapeutic Ball: Back Exercises. \"  Current as of: July 1, 2021               Content Version: 13.2  © 2006-2022 [employer]. Care instructions adapted under license by [employer] (which disclaims liability or warranty for this information). If you have questions about a medical condition or this instruction, always ask your healthcare professional. [address] any warranty or liability for your use of this information.

## 2022-06-17 ENCOUNTER — TELEPHONE (OUTPATIENT)
Dept: ORTHOPEDIC SURGERY | Age: 67
End: 2022-06-17

## 2022-06-17 NOTE — TELEPHONE ENCOUNTER
----- Message from Severiano Field MD sent at 6/6/2022  5:27 PM EDT -----  Regarding: referral fu notes  I sent the patient to [facility]. He apparently had an EMG, nothing under media. Plz obtain consult note and EMG results.  TY.

## 2022-06-17 NOTE — TELEPHONE ENCOUNTER
I contacted Dr Radha Kincaid office and spoke with Kaylee Michaels. She is going to fax these notes to me.

## 2022-07-06 ENCOUNTER — TELEPHONE (OUTPATIENT)
Dept: ORTHOPEDIC SURGERY | Age: 67
End: 2022-07-06

## 2022-07-06 NOTE — TELEPHONE ENCOUNTER
Patient states that he is doing pretty good right now as the weather is warm , but would like to schedule thespinal injection for the first part of august. I will reach out to patient when  We get closer to that date

## 2022-08-10 ENCOUNTER — TELEPHONE (OUTPATIENT)
Dept: ORTHOPEDIC SURGERY | Age: 67
End: 2022-08-10

## 2022-08-29 ENCOUNTER — OFFICE VISIT (OUTPATIENT)
Dept: ORTHOPEDIC SURGERY | Age: 67
End: 2022-08-29
Payer: MEDICARE

## 2022-08-29 VITALS — TEMPERATURE: 97.5 F | RESPIRATION RATE: 98 BRPM | HEART RATE: 55 BPM

## 2022-08-29 DIAGNOSIS — M79.672 LEFT FOOT PAIN: ICD-10-CM

## 2022-08-29 DIAGNOSIS — M10.072 ACUTE IDIOPATHIC GOUT OF LEFT FOOT: Primary | ICD-10-CM

## 2022-08-29 DIAGNOSIS — M54.16 LUMBAR NEURITIS: ICD-10-CM

## 2022-08-29 DIAGNOSIS — M54.16 LUMBAR RADICULOPATHY: ICD-10-CM

## 2022-08-29 DIAGNOSIS — M47.816 LUMBAR FACET ARTHROPATHY: ICD-10-CM

## 2022-08-29 DIAGNOSIS — R26.89 IMBALANCE: ICD-10-CM

## 2022-08-29 PROCEDURE — G8417 CALC BMI ABV UP PARAM F/U: HCPCS | Performed by: ORTHOPAEDIC SURGERY

## 2022-08-29 PROCEDURE — 73630 X-RAY EXAM OF FOOT: CPT | Performed by: ORTHOPAEDIC SURGERY

## 2022-08-29 PROCEDURE — 1123F ACP DISCUSS/DSCN MKR DOCD: CPT | Performed by: ORTHOPAEDIC SURGERY

## 2022-08-29 PROCEDURE — 3017F COLORECTAL CA SCREEN DOC REV: CPT | Performed by: ORTHOPAEDIC SURGERY

## 2022-08-29 PROCEDURE — G8432 DEP SCR NOT DOC, RNG: HCPCS | Performed by: ORTHOPAEDIC SURGERY

## 2022-08-29 PROCEDURE — G8427 DOCREV CUR MEDS BY ELIG CLIN: HCPCS | Performed by: ORTHOPAEDIC SURGERY

## 2022-08-29 PROCEDURE — 99203 OFFICE O/P NEW LOW 30 MIN: CPT | Performed by: ORTHOPAEDIC SURGERY

## 2022-08-29 PROCEDURE — G8536 NO DOC ELDER MAL SCRN: HCPCS | Performed by: ORTHOPAEDIC SURGERY

## 2022-08-29 PROCEDURE — 1101F PT FALLS ASSESS-DOCD LE1/YR: CPT | Performed by: ORTHOPAEDIC SURGERY

## 2022-08-29 RX ORDER — INDOMETHACIN 75 MG/1
75 CAPSULE, EXTENDED RELEASE ORAL DAILY
Qty: 30 CAPSULE | Refills: 2 | Status: SHIPPED | OUTPATIENT
Start: 2022-08-29 | End: 2022-11-27

## 2022-08-29 RX ORDER — INDOMETHACIN 25 MG/1
25 CAPSULE ORAL
COMMUNITY
Start: 2022-08-18

## 2022-08-29 RX ORDER — ALLOPURINOL 300 MG/1
300 TABLET ORAL DAILY
COMMUNITY
Start: 2022-08-03

## 2022-08-29 NOTE — PROGRESS NOTES
AMBULATORY PROGRESS NOTE      Patient: Merary Johnston             MRN: [id number]     SSN: [id number] There is no height or weight on file to calculate BMI. YOB: 1955     AGE: 74 y.o. EX: male    PCP: Tashi Underwood MD       IMPRESSION //  DIAGNOSIS AND TREATMENT PLAN      Merary Johnston has a diagnosis of:        Merary Johnston  had great toe first MTP inflammation, more than likely consistent with gout. Conservative care is recommended for him. DIAGNOSES    1. Acute idiopathic gout of left foot    2. Left foot pain        Orders Placed This Encounter    [15914] Foot Min 3V     Order Specific Question:   Weight bearing? Answer:   Yes    allopurinoL (ZYLOPRIM) 300 mg tablet     Sig: Take 300 mg by mouth daily. indomethacin (INDOCIN) 25 mg capsule     Sig: Take 25 mg by mouth. PLAN:    1. I obtained left foot 3V weightbearing XR in the office today. 2. I advise him to continue to wear what is comfortable. 3.  Physical therapy was offered but patient declined. 4. Rx Indocin SR 75 mg 1 tab daily    RTO 6 weeks    Patient Instructions   Spenco Total Max Arch Support : [employer] $27-45         Please follow up with your PCP for any health maintenance as recommended         Merary Johnston  expresses understanding of the diagnosis, treatment plan, and all of their proposed questions were answered to their satisfaction. Patient education has been provided re the diagnoses. HPI //  OBJECTIVE EXAMINATION      Merary Johnston IS A 74 y.o. male who is a/an  new patient, presenting to my outpatient office for evaluation of  the following chief complaint(s):     Chief Complaint   Patient presents with    Foot Pain     left       Merary Johnston presents today with pain and swelling in the left foot. He reports an episode of swelling, warmth, and redness in the left 1st toe x 3 weeks.  He denies any inciting injury but states he had eaten tuna for 3 days and drank sparkling water the day before the episode began. He states he followed up with another physician (his PCP was out of the office), underwent blood tests for uric acid, and was prescribed allopurinol and indomethacin x 10 days. He reports relief with the medications but states the swelling returned after he ran out of indomethacin. He further states that he was discontinued from allopurinol by his PCP. He rates his current pain at a 2/10. He takes Zanaflex and Neurontin for lumbar pain, Prilosec for acid reflux, and Tylenol and ibuprofen as needed for pain. He also reports a history of lumbar pain and crepitus in the right lumbar spine with extension and inquires whether his foot alignment is contributing. [facility] BLOOD WORK August 3, 2022. Total cholesterol was 218, triglyceride normal 148, LDL elevated 159. Chemistry, was normal, GFR greater than 60, creatinine 1.0 BUN 12. Normal electrolytes and: Potassium bicarb chloride normal, calcium normal 9.4. Hepatitis panel, negative, HIV panel: Negative: Uric acid level 7.1. Upper end of normal.  The range is 3.9, to 9.0. Visit Vitals  Pulse (!) 55   Temp 97.5 °F (36.4 °C)   Resp (!) 98       Appearance: Alert, well appearing and pleasant patient who is in no distress, oriented to person, place/time, and who follows commands. Normal dress/motor activity/thought processes/memory. This patient is accompanied in the examination room by his  wife. Patient arrives to office via: with assistive device: walking stick    Psychiatric:  Normal Affect/mood. Judgement, behavior, and conduct are appropriate. Speech normal in context and clarity, memory intact grossly, no involuntary movements - tremors. H EENT (2): Head normocephalic & atraumatic. Eye: pupils are round// EOM are intact // Neck: ROM WNL  // Hearings Intact   Respiratory: Breathing non labored     ANKLE/FOOT left    Gait: uses assistive device - walking stick.   He walks very slowly. He does have a history of lumbar osteoarthritis and having had back surgery as well. 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 Joint Motion: Dorsiflexion to neutral, 20 degrees plantarflexion, full eversion, near-full inversion. WNL   Joint / Tendon Stability:  No Ankle or Subtalar instability or joint laxity. No peroneal sublux ability or dislocation  Alignment: Mild bilateral (R>L) planovalgus hindfoot with pronation. Mild hallux valgus. Neuro Motor/Sensory: NL/NL  Vascular: 1+ DP and PT pulses   Lymphatics: No extremity lymphedema, No calf swelling, no tenderness to calf muscles. 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  was reviewed by Esteban Eastman MD, on 8/29/2022. Pain Assessment  8/29/2022   Location of Pain -   Location Modifiers Left   Severity of Pain 2   Quality of Pain Throbbing   Quality of Pain Comment -   Duration of Pain -   Duration of Pain Comment -   Frequency of Pain Intermittent   Frequency of Pain Comment -   Date Pain First Started -   Date Pain First Started Comment -   Aggravating Factors Standing;Walking   Aggravating Factors Comment -   Limiting Behavior Yes   Relieving Factors -   Relieving Factors Comment -   Result of Injury No   Work-Related Injury -   How long out of work? 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      Patients is employed at:          has no past medical history of Anemia, Arthritis, Asthma, Autoimmune disease ([facility] CAD (coronary artery disease), Cancer ([facility] Chronic kidney disease, Chronic obstructive pulmonary disease ([facility] Congestive heart failure ([facility] Diabetes ([facility] Hypertension, Liver disease, Osteoporosis, Seizures ([facility] Stroke ([facility] or Thromboembolus ([facility] has no past surgical history on file. family history is not on file. Current Outpatient Medications   Medication Sig    allopurinoL (ZYLOPRIM) 300 mg tablet Take 300 mg by mouth daily. indomethacin (INDOCIN) 25 mg capsule Take 25 mg by mouth.    gabapentin (NEURONTIN) 300 mg capsule TAKE 2 CAPSULES BY MOUTH THREE TIMES DAILY. MAX DAILY AMOUNT: 1800 MG    ibuprofen (MOTRIN) 800 mg tablet Take 800 mg by mouth every eight (8) hours as needed. tiZANidine (ZANAFLEX) 2 mg tablet Take 4 mg by mouth two (2) times daily as needed. amitriptyline (ELAVIL) 25 mg tablet Take 25 mg by mouth nightly as needed. omeprazole (PRILOSEC) 10 mg capsule Take 10 mg by mouth daily. naproxen sodium 220 mg cap Take 1 Tablet by mouth two (2) times daily as needed. acetaminophen (TYLENOL) 325 mg tablet Take 800 mg by mouth three (3) times daily. No current facility-administered medications for this visit. No Known Allergies  Social History     Occupational History    Not on file   Tobacco Use    Smoking status: Never    Smokeless tobacco: Never   Vaping Use    Vaping Use: Never used   Substance and Sexual Activity    Alcohol use: No    Drug use: No    Sexual activity: Not on file       reports that he has never smoked. He has never used smokeless tobacco. He reports that he does not drink alcohol and does not use drugs.       DIAGNOSTIC LAB DATA      No results found for: HBA1C, HEY4YCWE, [id number] //   Lab Results   Component Value Date/Time    Glucose (POC) 96 01/11/2022 08:58 AM        No results found for: QRB3RLDV, [id number]      No results found for: Tyrone Spangler, [address], Panchito Asher, Carlos Alvarenga, [id number]     Drug Screen Most Recent Result Date    No resulted procedures found. REVIEW OF SYSTEMS : 8/29/2022  ALL BELOW ARE Negative except : SEE HPI     All other systems reviewed and are negative. 12 point review of systems otherwise negative unless noted in HPI. RADIOGRAPHS// IMAGING//DIAGNOSTIC DATA     Orders Placed This Encounter    [56043] Foot Min 3V    allopurinoL (ZYLOPRIM) 300 mg tablet    indomethacin (INDOCIN) 25 mg capsule               Left foot  X-rays, NON WEIGHT BEARING, 3 views, AP/LAT/OBL completed 8/29/2022 AT [facility]    X-rays reveal Osseous: There is slight lucency seen to the medial eminence region of the great toe first MTP metatarsal, and to the proximal phalanx region of the great toe first MTP.:  Patient does have a history of gout no acute fracture//there are no dislocation or subluxation noted. Overall alignment is is acceptable. Soft tissue swelling is mild to the medial eminence of the left great toe first MTP noted. No osteolytic or osteoblastic lesions noted. Mineralization suggests yes osteopenia. Degenerative changes are mild to the medial first MTP joint noted. Calcified vessels are not present. I have personally reviewed the images of the above study. The interpretation of this study is my professional opinion           I have spent 30 minutes reviewing the previous notes, reviewing diagnostic studies [Advanced  Imaging, Diagnostic test results (x-rays)] and had a direct face to face with the patient discussing the diagnosis and importance of compliance with the treatment and plan. There is  discussion for the potential for surgery, answering all questions, as well as documenting patient care coordination for this individual on the day of the visit.         Disclaimer:     Sections of this note are dictated using utilizing voice recognition software, which may have resulted in some phonetic based errors in grammar and contents. Even though attempts were made to correct all the mistakes, some may have been missed, and remained in the body of the document. If questions arise, please contact our department. An electronic signature was used to authenticate this note. Khris Henderson may have a reminder for a \"due or due soon\" health maintenance. I have asked that he contact his primary care provider for follow-up on this health maintenance. Patient Instructions   Spenco Total Max Arch Support : [employer] $27-45         Please follow up with your PCP for any health maintenance as recommended.                 Scribed by Sher Dhaliwal, as dictated by Saroj Orbien MD.   8/29/2022  7:32 AM

## 2022-11-01 ENCOUNTER — HOSPITAL ENCOUNTER (OUTPATIENT)
Age: 67
Setting detail: OUTPATIENT SURGERY
Discharge: HOME | End: 2022-11-01
Attending: PHYSICAL MEDICINE & REHABILITATION | Admitting: PHYSICAL MEDICINE & REHABILITATION
Payer: MEDICARE

## 2022-11-01 ENCOUNTER — APPOINTMENT (OUTPATIENT)
Dept: GENERAL RADIOLOGY | Age: 67
End: 2022-11-01
Attending: PHYSICAL MEDICINE & REHABILITATION
Payer: MEDICARE

## 2022-11-01 VITALS
OXYGEN SATURATION: 94 % | SYSTOLIC BLOOD PRESSURE: 113 MMHG | HEART RATE: 62 BPM | TEMPERATURE: 98.5 F | DIASTOLIC BLOOD PRESSURE: 74 MMHG | RESPIRATION RATE: 16 BRPM

## 2022-11-01 PROCEDURE — 64483 NJX AA&/STRD TFRM EPI L/S 1: CPT | Performed by: PHYSICAL MEDICINE & REHABILITATION

## 2022-11-01 PROCEDURE — 74011000636 HC RX REV CODE- 636: Performed by: PHYSICAL MEDICINE & REHABILITATION

## 2022-11-01 PROCEDURE — 74011250636 HC RX REV CODE- 250/636: Performed by: PHYSICAL MEDICINE & REHABILITATION

## 2022-11-01 PROCEDURE — 74011250637 HC RX REV CODE- 250/637: Performed by: PHYSICAL MEDICINE & REHABILITATION

## 2022-11-01 PROCEDURE — 2709999900 HC NON-CHARGEABLE SUPPLY: Performed by: PHYSICAL MEDICINE & REHABILITATION

## 2022-11-01 PROCEDURE — 74011000250 HC RX REV CODE- 250: Performed by: PHYSICAL MEDICINE & REHABILITATION

## 2022-11-01 PROCEDURE — 77030003669 HC NDL SPN COOK -B: Performed by: PHYSICAL MEDICINE & REHABILITATION

## 2022-11-01 PROCEDURE — 76010000009 HC PAIN MGT 0 TO 30 MIN PROC: Performed by: PHYSICAL MEDICINE & REHABILITATION

## 2022-11-01 PROCEDURE — 77030039433 HC TY MYLEOGRAM BD -B: Performed by: PHYSICAL MEDICINE & REHABILITATION

## 2022-11-01 RX ORDER — DEXAMETHASONE SODIUM PHOSPHATE 100 MG/10ML
INJECTION INTRAMUSCULAR; INTRAVENOUS AS NEEDED
Status: DISCONTINUED | OUTPATIENT
Start: 2022-11-01 | End: 2022-11-01 | Stop reason: HOSPADM

## 2022-11-01 RX ORDER — LIDOCAINE HYDROCHLORIDE 10 MG/ML
INJECTION, SOLUTION EPIDURAL; INFILTRATION; INTRACAUDAL; PERINEURAL AS NEEDED
Status: DISCONTINUED | OUTPATIENT
Start: 2022-11-01 | End: 2022-11-01 | Stop reason: HOSPADM

## 2022-11-01 RX ORDER — DIAZEPAM 5 MG/1
5-20 TABLET ORAL ONCE
Status: COMPLETED | OUTPATIENT
Start: 2022-11-01 | End: 2022-11-01

## 2022-11-01 RX ADMIN — DIAZEPAM 10 MG: 5 TABLET ORAL at 09:43

## 2022-11-01 NOTE — PERIOP NOTES
Patient verbalized understanding of discharge instructions and verbally consented to [facility] Patient Consent. Signature pad not working.

## 2022-11-01 NOTE — PROCEDURES
SELECTIVE NERVE ROOT BLOCK PROCEDURE NOTE      Patient Name: Lise Perry  Date of Procedure: November 1, 2022  Preoperative Diagnosis:  Lumbar radiculopathy [M54.16]  Post Operative Diagnosis:  Lumbar radiculopathy [M54.16]  Location:  [place], [place]    Procedure :    bilateral  L5 Selective Nerve Root Block    Consent:  Informed consent was obtained prior to the procedure. The patient was given the opportunity to ask questions regarding the procedure and its associated risks. In addition to the potential risks associated with the procedure itself, the patient was informed both verbally and in writing of the potential side effects of the use of glucocorticoid. The patient appeared to comprehend the informed consent and desired to have the procedure performed. Procedure: The patient was placed in the prone position on the fluoroscopy table and the back was prepped and draped in the usual sterile manner. The exact spinal level was  identified using fluoroscopy, and Lidocaine 1 % was injected locally, a 22 gauge spinal needle was passed to the transverse process. The depth was noted and the needle redirected to pass inferior and approximately one cm anterior to the transverse process. YES  1 cc of Isovue M-200 was used to verify positioning in the epidural and paravertebral space and outlined the course of the spinal nerve into the epidural space. The same procedure was repeated at each spinal level indicated above. No vascular uptake was identified. A total of 10 mg of preservative free Dexamethasone and 1 cc of Lidocaine/site was slowly injected. The patient tolerated the procedure well. The injection area was cleaned and bandaids applied. Not excessive bleeding was noted. Patient dressed and discharged to home with instructions. Discussion: The patient tolerated the procedure well.  Patient reported anna-procedural pain on Visual Analog Scale:  pre-4; post-4.                                               Agnes Coates MD  November 1, 2022

## 2022-11-01 NOTE — INTERVAL H&P NOTE
Update History & Physical    Patient presented with ongoing LBP radiating into his BL buttocks and post thighs, R>L. Symptoms unchanged from previous. No new weakness, neurogenic bowel or bladder changes. The surgical site was confirmed by the patient and me. Plan:  The risk, benefits, expected outcome, and alternative to the recommended procedure have been discussed with the patient. Patient understands and wants to proceed with the procedure.     Electronically signed by Azeem Salazar MD on 11/1/2022 at 11:46 AM

## 2022-11-01 NOTE — H&P
Office Visit  2022  [facility]  Becca Valentino MD  Physical Medicine and Rehabilitation Physician Lumbar radiculopathy +2 more  Dx Back Pain; Referred by Unknown  Reason for Visit     Progress Notes  Becca Valentino MD (Physician)   Physical Medicine and Rehabilitation Physician            [address] [facility] [address]  [place], [facility] [address]  Phone: [phone]  Fax: [phone]           Tru Griggs  : 1955  PCP: Cassie Mendez MD     PROGRESS NOTE        ASSESSMENT AND PLAN     Diagnoses and all orders for this visit:     1. Lumbar radiculopathy  -     gabapentin (NEURONTIN) 300 mg capsule; TAKE 2 CAPSULES BY MOUTH THREE TIMES DAILY. MAX DAILY AMOUNT: 1800 MG  -     SCHEDULE SURGERY     2. Lumbar facet arthropathy     3. Imbalance           Zenaida Baptiste is a 74 y.o. male with right lumbar radiculopathy, back pain, and imbalance. Will obtain neurology evaluation. Addendum- notes received and reviewed. EMG/NCV findings below. RF Gabapentin  Given instructions on ball exercises. Perform as tolerated. Advised to continue HEP as tolerated. Schedule B/L L5 SNRB  Avoid repetitive bending, lifting, and twisting     Follow-up and Dispositions    Return in about 3 months (around 2022) for medication management. HISTORY OF PRESENT ILLNESS        Zenaida Baptiste is a 74 y.o. male presents for follow up of back pain. He reports intermittent pain exacerbated with standing and walking. His leg pain is increased with prolonged sitting. Feels limited as far as walking in the parking lot. Feels unsteady     Reports that he saw Dr. Yasmeen Coelho, neurologist and had an EMG. I do not have these results. He continues to take Gabapentin and Zanaflex with benefit. Zanaflex promotes mild somnolence.  He is compliant with his HEP     Pain Assessment  2022   Location of Pain Back   Location Modifiers - Severity of Pain 5   Quality of Pain Other (Comment)   Quality of Pain Comment stiffness   Duration of Pain Persistent   Duration of Pain Comment -   Frequency of Pain Intermittent   Frequency of Pain Comment -   Date Pain First Started -   Date Pain First Started Comment -   Aggravating Factors Other (Comment)   Aggravating Factors Comment standing too long   Limiting Behavior Yes   Relieving Factors Other (Comment)   Relieving Factors Comment sitting, lying down   Result of Injury No   Work-Related Injury -   How long out of work? -   Type of Injury -   Type of Injury Comment -         Onset: 2018, work injury     Investigations:   LE EMG/NCV 5/2022: Left chronic L5, BL chronic S1 radiculopathy  L MRI 2021: moderate FA L4-5, mild FA L5-S1, right L5-S1 HNP  C MRI 2019: Multilevel degenerative changes, mild stenosis  T MRI 2019: Benign  Spine surgery consult: none     Treatments:  Physical therapy: 1/2022 with temporary benefit  Spinal injections: 1/2022 B/L L5 ZOE with greater than 50% improvement,  unilateral lumbar RF 2019? with benefit WC injury. Spinal surgery- none  Beneficial medications: Gabapentin, Ibuprofen, Zanaflex  Failed medications: Baclofen (dizziness, sweats), Mobic      Work Status: on disability. Previously worked at [employer]. hx Worker's Comp. injury in 10/2018, treated by Dr. Renee Lang.   Pertinent PMHx:  GERD     PHYSICAL EXAMINATION     Visit Vitals  Pulse (!) 58 Comment: pt asymptomatic, MD aware   Temp 97.1 °F (36.2 °C) (Tympanic)   Ht 6' (1.829 m)   Wt 220 lb (99.8 kg)   SpO2 98% Comment: RA   BMI 29.84 kg/m²      Ambulation with walking stick  Hip hiking on the right  Negative Russo's  DTRs 1+ UE  LE strength intact while seated  Wide base of support  No clonus  Negative Connor's                    Written by Alen Valenzuela, as dictated by Fady Anaya MD.         Note Details     Other Notes      SCHEDULE SURGERY Procedure note    SCHEDULE SURGERY Procedure note    Progress Notes from Ayla Pacheco LPN  Level of Service Calculator Selections    Number and Complexity of Problems Addressed                    1 or more chronic illness with exacerbation, progression, or side effects of treatment            Risk of Complications and/or Morbidity or Mortality of Patient Management                    Moderate                Please see the note for further information on patient assessment and treatment. Instructions        Return in about 3 months (around 9/6/2022) for medication management. Therapeutic Ball: Back Exercises  Introduction  Here are some examples of typical exercises for your condition. Start each exercise slowly. Ease off the exercise if you start to have pain. Your doctor or physical therapist will tell you when you can start these exercises and which ones will work best for you. To prepare, make sure that your ball is the right size for you. When inflated and firm, it should allow you to sit with your hips and knees bent at about a 90-degree angle (like the letter L). How to do the exercises  Seated position on ball    Use this exercise to get used to moving on the ball and to find your best sitting position. Sit comfortably on the ball with your feet about hip-width apart. If you feel unsteady, rest your hands on the ball near your hips. As you do this exercise, try to keep your shoulders and upper body relaxed and still. Using your stomach and back muscles to move your pelvis, roll the ball forward. This will round your back. Still using your stomach and back muscles, roll the ball back. You will arch your back. Repeat this rounding-arching motion a few times. Stop in between the two positions, where your back is not rounded or arched. This is called your neutral position. Pelvic rotation    Sit tall on the ball. Slowly rotate your hips in a Torres Martinez pattern. Keep the movement focused at your hips.   Repeat, but Torres Martinez in the other direction. Repeat 8 to 12 times. Postural sitting    Use this position to find a stable, relaxed posture on the ball. You can use this position as your starting point for other ball exercises. If you feel unsteady on the ball, start on a chair first.  Sit on a ball or chair, with your feet planted straight in front of you. Imagine that a string at the top of your head is pulling you straight up. Think of yourself as 2 inches taller than you are. Slightly tuck your chin. Keep your shoulders back and relaxed. Knee extension    Sit tall on the ball with your feet planted in front of you, hip-width apart. As you do this exercise, avoid slumping your shoulders and arching your back. Rest your hands on the ball near your hip or a steady object next to you. (If you feel very stable on the ball, rest your hands in your lap or at your side.)  Slowly straighten one leg at the knee. Slowly lower it back down. Repeat with the other leg. Repeat this exercise 8 to 12 times. Roll-ups    Lie on your back with your knees bent, feet resting on the floor. Lay the ball on your thighs. Rest your hands up high on the ball. Raising your head and shoulder blades, roll the ball up your thighs. Exhale as you roll up. If this is hard on your neck, gently support your lower head and upper neck with one hand. Don't use that hand to pull your head up. Repeat 8 to 12 times. Ball curls    Lie on your back with your ankles resting on the ball, knees straight. Use your legs to roll the exercise ball toward you. Allow your knees to bend and move closer to your chest.  Pause briefly, and then roll the ball to the starting position. Try to keep the ball rolling straight. You will feel the muscles in your lower belly working. Repeat 8 to 12 times. Bridge with ball under legs    Lie on your back with your legs up, calves resting on the ball. For more challenge, rest your heels on the ball.   Look up at the ceiling, and keep your chin relaxed. You can place a small pillow under your head or neck for comfort. With your arms by your side, press your hands onto the floor for stability. Tighten your belly muscles by pulling in your belly button toward your spine. Push your heels down toward the floor, squeeze your buttocks, and lift your hips off the floor until your shoulders, hips, and knees are all in a straight line. Try to keep the ball steady. Hold for about 6 seconds as you continue to breathe normally. Slowly lower your hips back down to the floor. Repeat 8 to 12 times. Ball curls with bridge    Start flat on your back with your ankles resting on the ball. Look up at the ceiling, and keep your chin relaxed. You can place a small pillow under your head or neck for comfort. With your arms by your side, press your hands onto the floor for stability. Tighten your belly muscles by pulling in your belly button toward your spine. Push your heels down toward the floor, squeeze your buttocks, and lift your hips off the floor until your shoulders, hips, and knees are all in a straight line. While holding the bridge position, roll the ball toward you with your heels. Keep your hips as level as you can. Pause briefly, and then roll the ball back out. Try to keep the ball rolling straight. You will feel the muscles in your lower belly working as you straighten your legs. Lower your hips, and return to your starting position. Repeat 8 to 12 times. When you can keep your body and the ball steady throughout this exercise, you're ready for more challenge. Try keeping your hips raised while rolling the ball out, holding the bridge, and rolling back, a few times in a row. Praying marquis Leon upright with the ball in front of you. To start, clasp your hands together. Rest them on the ball in front of you. As you do this exercise, keep your back and hips straight and tighten your belly and buttocks muscles.  Keep your knees in place.  Press on the ball with your arms. Lean forward from the knees. This rolls the ball forward. You will bear most of your weight on your arms. If your back starts to ache, you've gone too far. Pull back a bit. Roll back to the start position. Repeat 8 to 12 times. Walk-out plank on ball    Kneel over the ball. Place your hands on the floor in front of you. Walk your hands forward until your legs are straight on the ball. This is the plank position. When in plank position, hold your body straight and tighten your belly and buttocks muscles. Keep your chin slightly tucked. Roll as far forward as you can without losing your balance or letting your hips drop. You may stop with the ball under your thighs, or even under your knees or shins. Hold a few seconds, then walk your hands back and return to the start position. Repeat 8 to 12 times. Push-up with thighs on ball    Kneel over the ball. Place your hands on the floor in front of you. Walk your hands forward until your legs are straight on the ball. This is the plank position. When in plank position, hold your body straight and tighten your belly and buttocks muscles. Keep your chin slightly tucked. Roll as far forward as you can without losing your balance or letting your hips drop. You may stop with the ball under your thighs, or even under your knees or shins. Bend your elbows. Slowly lower your body toward the ground as far as you can without losing your balance. If your wrists hurt, try moving your hands a little farther apart so they're not right under your shoulders. Slowly straighten your arms. Do 8 to 12 of these push-ups. Wall squat with ball    Stand facing away from a wall. Place your feet about shoulder-width apart. Place the ball between your middle back and the wall. Move your feet out in front of you so they are about a foot in front of your hips. Keep your arms at your sides, or put your hands on your hips.   Slowly squat down as if you are going to sit in a chair, rolling your back over the ball as you squat. The ball should move with you but stay pressed into the wall. Be sure that your knees do not go in front of your toes as you squat. Hold for 6 seconds. Slowly rise to your standing position. Repeat 8 to 12 times. Child's pose with ball    Kneeling upright with your back straight, rest your hands on the ball in front of you. Breathe out as you bend at the hips, and roll the ball forward. Lower your chest toward the ground, and drop your hips back toward your heels. To stretch your upper back and shoulders, hold this position for 15 to 30 seconds. Repeat 2 to 4 times. Follow-up care is a key part of your treatment and safety. Be sure to make and go to all appointments, and call your doctor if you are having problems. It's also a good idea to know your test results and keep a list of the medicines you take. Where can you learn more? Go to http://www.gray.com/  Enter W664 in the search box to learn more about \"Therapeutic Ball: Back Exercises. \"  Current as of: July 1, 2021               Content Version: 13.2  © 2756-3670 [employer]. Care instructions adapted under license by [employer] (which disclaims liability or warranty for this information). If you have questions about a medical condition or this instruction, always ask your healthcare professional. [place] [address] any warranty or liability for your use of this information.                        To Take With You (Printed 6/6/2022)      Additional Documentation    Vitals:  Pulse 58 Important       Temp 97.1 °F (36.2 °C) (Tympanic)    Ht 6' (1.829 m)    Wt 220 lb (99.8 kg)    SpO2 98%     BMI 29.84 kg/m²    BSA 2.25 m²    Pain Sc   5 (Loc: Back)          More Vitals     Flowsheets:  Fluid Management,    Pain Assessment       Encounter Info:  Billing Info,    History,    Allergies,    Detailed Report         Communications        Letter sent to Kassi Rascon MD    Provider Notes        Media  From this encounter  Scan on 6/15/2022 1434 by Nova Peñaloza: Internal Documents/DAVIDE Mast One/Blue Sheet (Scheduling Instructions)/06-06-22     BestPractice Advisories    Click to view BestPractice Advisory history     Encounter Messages    No messages in this encounter     Orders Placed    SCHEDULE SURGERY  Outpatient Medications at End of Encounter as of 6/6/2022    gabapentin (NEURONTIN) 300 mg capsule (Taking) TAKE 2 CAPSULES BY MOUTH THREE TIMES DAILY. MAX DAILY AMOUNT: 1800 MG   ibuprofen (MOTRIN) 800 mg tablet (Taking) Take 800 mg by mouth every eight (8) hours as needed. tiZANidine (ZANAFLEX) 2 mg tablet (Taking) Take 4 mg by mouth two (2) times daily as needed. amitriptyline (ELAVIL) 25 mg tablet (Taking) Take 25 mg by mouth nightly as needed. omeprazole (PRILOSEC) 10 mg capsule (Taking) Take 10 mg by mouth daily. naproxen sodium 220 mg cap (Taking) Take 1 Tablet by mouth two (2) times daily as needed. acetaminophen (TYLENOL) 325 mg tablet (Taking) Take 800 mg by mouth three (3) times daily.      Visit Diagnoses        Lumbar radiculopathy        Lumbar facet arthropathy        Imbalance       Problem List

## 2022-12-14 ENCOUNTER — OFFICE VISIT (OUTPATIENT)
Dept: ORTHOPEDIC SURGERY | Age: 67
End: 2022-12-14
Payer: MEDICARE

## 2022-12-14 VITALS
OXYGEN SATURATION: 98 % | WEIGHT: 215 LBS | BODY MASS INDEX: 29.12 KG/M2 | TEMPERATURE: 97.3 F | RESPIRATION RATE: 18 BRPM | HEIGHT: 72 IN | HEART RATE: 60 BPM

## 2022-12-14 DIAGNOSIS — M47.816 LUMBAR FACET ARTHROPATHY: Primary | ICD-10-CM

## 2022-12-14 DIAGNOSIS — R26.2 AMBULATORY DYSFUNCTION: ICD-10-CM

## 2022-12-14 DIAGNOSIS — M54.16 LUMBAR RADICULOPATHY: ICD-10-CM

## 2022-12-14 PROCEDURE — 99214 OFFICE O/P EST MOD 30 MIN: CPT | Performed by: PHYSICAL MEDICINE & REHABILITATION

## 2022-12-14 PROCEDURE — 3017F COLORECTAL CA SCREEN DOC REV: CPT | Performed by: PHYSICAL MEDICINE & REHABILITATION

## 2022-12-14 PROCEDURE — 1101F PT FALLS ASSESS-DOCD LE1/YR: CPT | Performed by: PHYSICAL MEDICINE & REHABILITATION

## 2022-12-14 PROCEDURE — 1123F ACP DISCUSS/DSCN MKR DOCD: CPT | Performed by: PHYSICAL MEDICINE & REHABILITATION

## 2022-12-14 PROCEDURE — G8536 NO DOC ELDER MAL SCRN: HCPCS | Performed by: PHYSICAL MEDICINE & REHABILITATION

## 2022-12-14 PROCEDURE — G8427 DOCREV CUR MEDS BY ELIG CLIN: HCPCS | Performed by: PHYSICAL MEDICINE & REHABILITATION

## 2022-12-14 PROCEDURE — G8417 CALC BMI ABV UP PARAM F/U: HCPCS | Performed by: PHYSICAL MEDICINE & REHABILITATION

## 2022-12-14 PROCEDURE — G8432 DEP SCR NOT DOC, RNG: HCPCS | Performed by: PHYSICAL MEDICINE & REHABILITATION

## 2022-12-14 RX ORDER — COVID-19 MOLECULAR TEST ASSAY
KIT MISCELLANEOUS
COMMUNITY
Start: 2022-11-28

## 2022-12-14 RX ORDER — GABAPENTIN 600 MG/1
600 TABLET ORAL 3 TIMES DAILY
Qty: 270 TABLET | Refills: 1 | Status: SHIPPED | OUTPATIENT
Start: 2022-12-14

## 2022-12-14 RX ORDER — ROSUVASTATIN CALCIUM 10 MG/1
10 TABLET, COATED ORAL
COMMUNITY
Start: 2022-09-08

## 2022-12-14 NOTE — LETTER
12/14/2022    Patient: Giacomo Khan   YOB: 1955   Date of Visit: 12/14/2022     Bailey Santana MD  [address] [address]  Via Fax: [phone]    Dear Bailey Santana MD,      Thank you for referring Mr. Giacomo Khan to [address] [place] [address] for evaluation. My notes for this consultation are attached. If you have questions, please do not hesitate to call me. I look forward to following your patient along with you.       Sincerely,    Brody Rob MD

## 2022-12-14 NOTE — PROGRESS NOTES
Mar Mathis [facility] [address]  [address]  [place] [address]  Phone: [phone]  Fax: [phone]        Ina [place]  : 1955  PCP: Vita Gomez MD    PROGRESS NOTE      ASSESSMENT AND PLAN    Diagnoses and all orders for this visit:    1. Lumbar facet arthropathy  -     gabapentin (NEURONTIN) 600 mg tablet; Take 1 Tablet by mouth three (3) times daily. Max Daily Amount: 1,800 mg.  -     AMB SUPPLY ORDER    2. Lumbar radiculopathy by EDX  -     gabapentin (NEURONTIN) 600 mg tablet; Take 1 Tablet by mouth three (3) times daily. Max Daily Amount: 1,800 mg.    3. Ambulatory dysfunction  -     AMB SUPPLY ORDER      Jennings Severe is a 74 y.o. male with back stiffness and ambulatory dysfunction. He reports benefit with bilateral L5 nerve root blocks last month. Discussed the importance of daily stretches. Has previously been evaluated by neurology for other etiologies of ambulatory dysfunction. .   RF routine Gabapentin, he may wean to 300 as tolerated. Advised to continue HEP as tolerated. Discussed repeat injections briefly, as needed basis only. Discussed postural awareness with patient. DME for walking rollator. Follow-up and Dispositions    Return in about 6 months (around 2023) for medication management. HISTORY OF PRESENT ILLNESS      Jennings Severe is a 74 y.o. male presents for follow up of back pain. LV 2022. Pt received B/L L5 SNRB 2022 with 60% benefit. Denies side effects. Pt reports intermittent low back pain exacerbated with walking and weather changes. He has good and bad days. He notes increased stiffness with prolonged sitting. Denies numbness or tingling BLE. Pt ambulates with a walking stick for support. He mentions one episode of muscle tightness while walking about 3 weeks, and it has not happened since. Denies insomnia due to pain. He takes Gabapentin 600 mg TID with benefit.  Denies side effects. He does his HEP sometimes. Sleeps well. Asking about surgery. Pain Assessment  12/14/2022   Location of Pain Back   Location Modifiers -   Severity of Pain 6   Quality of Pain Dull   Quality of Pain Comment -   Duration of Pain Persistent   Duration of Pain Comment -   Frequency of Pain Intermittent   Frequency of Pain Comment -   Date Pain First Started -   Date Pain First Started Comment -   Aggravating Factors Bending;Walking   Aggravating Factors Comment -   Limiting Behavior Yes   Relieving Factors Rest;Heat;Other (Comment)   Relieving Factors Comment medication   Result of Injury No   Work-Related Injury -   How long out of work? -   Type of Injury -   Type of Injury Comment -         Onset: 2018, work injury     Investigations:   LE EMG/NCV 5/2022: Left chronic L5, BL chronic S1 radiculopathy  L MRI 2021: moderate FA L4-5, mild FA L5-S1, right L5-S1 HNP  C MRI 2019: Multilevel degenerative changes, mild stenosis  T MRI 2019: Benign  Spine surgery consult: none     Treatments:  Physical therapy: 1/2022 with temporary benefit  Spinal injections: B/L L5 SNRB 11/2022 with 60% benefit. 1/2022 B/L L5 ZOE with greater than 50% improvement,  unilateral lumbar RF 2019? with benefit WC injury. Spinal surgery- none  Beneficial medications: Gabapentin, Ibuprofen, Zanaflex  Failed medications: Baclofen (dizziness, sweats), Mobic      Work Status: on disability. Previously worked at [employer]. hx Worker's Comp. injury in 10/2018, treated by Dr. Damon Botello.   Pertinent PMHx:  GERD, gout    PHYSICAL EXAMINATION    Visit Vitals  Pulse 60   Temp 97.3 °F (36.3 °C) (Temporal)   Resp 18   Ht 6' (1.829 m)   Wt 215 lb (97.5 kg)   SpO2 98% Comment: RA   BMI 29.16 kg/m²     Non tender sciatic notch, trochanteric bursa  Patient has difficulty transitioning from sit to stand  Hamstring tightness  LE strength intact  SLR neg              Written by Yfn Garcia, as dictated by Nila Sheppard MD.

## 2022-12-14 NOTE — PROGRESS NOTES
Isaiah Bai presents today for   Chief Complaint   Patient presents with    Back Pain     Lower         Is someone accompanying this pt? no    Is the patient using any DME equipment during OV? no    Depression Screening:  3 most recent PHQ Screens 11/18/2021   Little interest or pleasure in doing things Not at all   Feeling down, depressed, irritable, or hopeless Not at all   Total Score PHQ 2 0       Learning Assessment:  Learning Assessment 3/14/2022   PRIMARY LEARNER Patient   PRIMARY LANGUAGE ENGLISH   LEARNER PREFERENCE PRIMARY LISTENING     READING   ANSWERED BY patient   RELATIONSHIP SELF       Abuse Screening:  Abuse Screening Questionnaire 3/14/2022   Do you ever feel afraid of your partner? N   Are you in a relationship with someone who physically or mentally threatens you? N   Is it safe for you to go home? Y       Fall Risk  Fall Risk Assessment, last 12 mths 12/14/2022   Able to walk? Yes   Fall in past 12 months? 0   Do you feel unsteady? 1   Are you worried about falling 1       OPIOID RISK TOOL  No flowsheet data found. Coordination of Care:  1. Have you been to the ER, urgent care clinic since your last visit? no  Hospitalized since your last visit? no    2. Have you seen or consulted any other health care providers outside of the [address] [place] [address] since your last visit? no Include any pap smears or colon screening.  no

## 2022-12-27 ENCOUNTER — TELEPHONE (OUTPATIENT)
Dept: ORTHOPEDIC SURGERY | Age: 67
End: 2022-12-27

## 2022-12-27 DIAGNOSIS — M47.816 LUMBAR FACET ARTHROPATHY: ICD-10-CM

## 2022-12-27 DIAGNOSIS — R26.2 AMBULATORY DYSFUNCTION: ICD-10-CM

## 2022-12-27 DIAGNOSIS — M54.16 LUMBAR NEURITIS: Primary | ICD-10-CM

## 2022-12-27 NOTE — TELEPHONE ENCOUNTER
The order, last office note and demographics were faxed to the provided number. A fax confirmation was received.

## 2022-12-27 NOTE — TELEPHONE ENCOUNTER
I called and spoke to the pt. The pt was identified using 2 pt identifiers. He was asked if there were any forms being faxed to our office for his walker. The pt verbalized that the location to get the walker is requesting more info to try and process the order. He needs the order and insurance info faxed to the number provided to see if he can get it. He confirmed the fax number in the previous message. The pt was notified that his insurance info, last office note and order will be faxed over today. Pt verbalized understanding and has no questions or concerns.

## 2022-12-28 DIAGNOSIS — M10.072 ACUTE IDIOPATHIC GOUT OF LEFT FOOT: ICD-10-CM

## 2022-12-29 ENCOUNTER — TELEPHONE (OUTPATIENT)
Dept: ORTHOPEDIC SURGERY | Age: 67
End: 2022-12-29

## 2022-12-29 NOTE — TELEPHONE ENCOUNTER
Patient called and needed to know what medical supply store the order for his rollator walker was sent to because he wants to call and speak with them. Patient can be reached at [phone].

## 2022-12-29 NOTE — TELEPHONE ENCOUNTER
I called and spoke to the pt. The pt was identified using 2 pt identifiers. The pt did not provide the name of the place he got the fax number from. I tried to put the fax number into a google search and it only came up as \"medical supply\". I let the pt know this. He is aware that that company should try to contact him in regards to the order that was sent on 12/27/22. I did let the pt know that there may possibly be a delay because of the holidays. He was asked to call the office by the end of next week if he does not hear from anyone. We can either refax the order or he can try and send it to another company. The pt verbalized understanding and has no questions or concerns at this time.

## 2022-12-31 RX ORDER — INDOMETHACIN 75 MG/1
CAPSULE, EXTENDED RELEASE ORAL
Qty: 30 CAPSULE | Refills: 2 | Status: SHIPPED | OUTPATIENT
Start: 2022-12-31

## 2023-03-17 RX ORDER — GABAPENTIN 600 MG/1
TABLET ORAL
Qty: 270 TABLET | OUTPATIENT
Start: 2023-03-17

## 2023-06-12 PROBLEM — G89.29 CHRONIC RADICULAR LOW BACK PAIN: Status: ACTIVE | Noted: 2023-06-12

## 2023-06-12 PROBLEM — M54.16 CHRONIC RADICULAR LOW BACK PAIN: Status: ACTIVE | Noted: 2023-06-12

## 2023-06-12 PROBLEM — M62.9 HAMSTRING TIGHTNESS OF BOTH LOWER EXTREMITIES: Status: ACTIVE | Noted: 2023-06-12

## 2023-12-11 NOTE — PROGRESS NOTES
[facility]    [address] [place] [address] [address]  Phone: [phone]  Fax: [phone]        Chris Parks  : 1955  PCP: Ivette Yi MD    PROGRESS NOTE      ASSESSMENT AND PLAN    Ar Salinas was seen today for back problem, pain and back pain. Diagnoses and all orders for this visit:    Lumbar facet arthropathy  -     tiZANidine (ZANAFLEX) 2 MG tablet; Take 1-2 tablets by mouth 2 times daily as needed (pain/stiffness)    Chronic radicular low back pain, EDx, BL  -     gabapentin (NEURONTIN) 600 MG tablet; Take 1 tablet by mouth 3 times daily for 180 days. Max Daily Amount: 1,800 mg    Ambulatory dysfunction    Hamstring tightness of both lower extremities        Chris Parks is a 79 y.o. male with chronic lumbar radiculopathy and ambulatory dysfunction. Clinically in the office today, he appears quite limited. Patient and spouse reports that he has good days and bad days. No symptoms to suggest motor neuron disease or myelopathy. Advised patient to continue doing his exercises. Given at home exercises. Refilled routine Gabapentin 600 mg 3 times daily. Has been unable to wean due to worsening of symptoms. Refilled Tizanidine PRN  Emphasized daily hamstring stretching for prevention of crouched gait. HISTORY OF PRESENT ILLNESS      Chris Parks is a 79 y.o. male presents for follow up of chronic radiculopathy/gait dysfunction. LV 2023 Refilled routine Gabapentin 600 mg TID, continue Zanaflex PRN and HEP. Patient presents today seated in a wheelchair and complains of continuing back pain. He describes his pain as stiffness, making it hard for him to move. He denies experiencing numbness and tingling in his legs. He does not believe his symptoms have worsened, but the cold weather significantly exacerbates his back pain and stiffness. He denies weakness in his arms.     He continues to use a single-point cane,

## 2023-12-13 ENCOUNTER — OFFICE VISIT (OUTPATIENT)
Age: 68
End: 2023-12-13
Payer: MEDICARE

## 2023-12-13 VITALS
HEIGHT: 72 IN | RESPIRATION RATE: 18 BRPM | BODY MASS INDEX: 29.93 KG/M2 | HEART RATE: 65 BPM | TEMPERATURE: 98.1 F | OXYGEN SATURATION: 100 % | WEIGHT: 221 LBS

## 2023-12-13 DIAGNOSIS — M47.816 LUMBAR FACET ARTHROPATHY: Primary | ICD-10-CM

## 2023-12-13 DIAGNOSIS — G89.29 CHRONIC RADICULAR LOW BACK PAIN: ICD-10-CM

## 2023-12-13 DIAGNOSIS — M54.16 CHRONIC RADICULAR LOW BACK PAIN: ICD-10-CM

## 2023-12-13 DIAGNOSIS — R26.2 AMBULATORY DYSFUNCTION: ICD-10-CM

## 2023-12-13 DIAGNOSIS — M62.9 HAMSTRING TIGHTNESS OF BOTH LOWER EXTREMITIES: ICD-10-CM

## 2023-12-13 PROCEDURE — G8419 CALC BMI OUT NRM PARAM NOF/U: HCPCS | Performed by: PHYSICAL MEDICINE & REHABILITATION

## 2023-12-13 PROCEDURE — G8427 DOCREV CUR MEDS BY ELIG CLIN: HCPCS | Performed by: PHYSICAL MEDICINE & REHABILITATION

## 2023-12-13 PROCEDURE — G8484 FLU IMMUNIZE NO ADMIN: HCPCS | Performed by: PHYSICAL MEDICINE & REHABILITATION

## 2023-12-13 PROCEDURE — 1036F TOBACCO NON-USER: CPT | Performed by: PHYSICAL MEDICINE & REHABILITATION

## 2023-12-13 PROCEDURE — 1123F ACP DISCUSS/DSCN MKR DOCD: CPT | Performed by: PHYSICAL MEDICINE & REHABILITATION

## 2023-12-13 PROCEDURE — 3017F COLORECTAL CA SCREEN DOC REV: CPT | Performed by: PHYSICAL MEDICINE & REHABILITATION

## 2023-12-13 PROCEDURE — 99214 OFFICE O/P EST MOD 30 MIN: CPT | Performed by: PHYSICAL MEDICINE & REHABILITATION

## 2023-12-13 RX ORDER — TIZANIDINE 2 MG/1
2-4 TABLET ORAL 2 TIMES DAILY PRN
Qty: 120 TABLET | Refills: 2 | Status: SHIPPED | OUTPATIENT
Start: 2023-12-13

## 2023-12-13 RX ORDER — GABAPENTIN 600 MG/1
600 TABLET ORAL 3 TIMES DAILY
Qty: 270 TABLET | Refills: 1 | Status: SHIPPED | OUTPATIENT
Start: 2023-12-13 | End: 2024-06-10

## 2023-12-13 NOTE — PROGRESS NOTES
Truman Rizvi presents today for   Chief Complaint   Patient presents with    Back Problem    Pain    Back Pain       Is someone accompanying this pt? no    Is the patient using any DME equipment during OV? no    Depression Screening:       No data to display                Learning Assessment:  No flowsheet data found. Abuse Screening:       No data to display                Fall Risk  No flowsheet data found. OPIOID RISK TOOL  No flowsheet data found. Coordination of Care:  1. Have you been to the ER, urgent care clinic since your last visit? no  Hospitalized since your last visit? no    2. Have you seen or consulted any other health care providers outside of the [address] [place] [address] since your last visit? no Include any pap smears or colon screening.  no

## 2024-08-07 ENCOUNTER — OFFICE VISIT (OUTPATIENT)
Age: 69
End: 2024-08-07
Payer: MEDICARE

## 2024-08-07 VITALS
WEIGHT: 220 LBS | OXYGEN SATURATION: 96 % | TEMPERATURE: 96.8 F | HEART RATE: 70 BPM | DIASTOLIC BLOOD PRESSURE: 79 MMHG | BODY MASS INDEX: 29.8 KG/M2 | SYSTOLIC BLOOD PRESSURE: 135 MMHG | HEIGHT: 72 IN

## 2024-08-07 DIAGNOSIS — M54.16 CHRONIC RADICULAR LOW BACK PAIN: ICD-10-CM

## 2024-08-07 DIAGNOSIS — M47.816 LUMBAR FACET ARTHROPATHY: Primary | ICD-10-CM

## 2024-08-07 DIAGNOSIS — G89.29 CHRONIC RADICULAR LOW BACK PAIN: ICD-10-CM

## 2024-08-07 DIAGNOSIS — R26.2 AMBULATORY DYSFUNCTION: ICD-10-CM

## 2024-08-07 PROCEDURE — G8419 CALC BMI OUT NRM PARAM NOF/U: HCPCS | Performed by: PHYSICAL MEDICINE & REHABILITATION

## 2024-08-07 PROCEDURE — 99212 OFFICE O/P EST SF 10 MIN: CPT | Performed by: PHYSICAL MEDICINE & REHABILITATION

## 2024-08-07 PROCEDURE — 1036F TOBACCO NON-USER: CPT | Performed by: PHYSICAL MEDICINE & REHABILITATION

## 2024-08-07 PROCEDURE — 3017F COLORECTAL CA SCREEN DOC REV: CPT | Performed by: PHYSICAL MEDICINE & REHABILITATION

## 2024-08-07 PROCEDURE — G8428 CUR MEDS NOT DOCUMENT: HCPCS | Performed by: PHYSICAL MEDICINE & REHABILITATION

## 2024-08-07 PROCEDURE — 1123F ACP DISCUSS/DSCN MKR DOCD: CPT | Performed by: PHYSICAL MEDICINE & REHABILITATION

## 2024-08-07 RX ORDER — TIZANIDINE 2 MG/1
2-4 TABLET ORAL 2 TIMES DAILY PRN
Qty: 120 TABLET | Refills: 2 | Status: SHIPPED | OUTPATIENT
Start: 2024-08-07

## 2024-08-07 RX ORDER — GABAPENTIN 600 MG/1
600 TABLET ORAL 3 TIMES DAILY
Qty: 270 TABLET | Refills: 1 | Status: SHIPPED | OUTPATIENT
Start: 2024-08-07 | End: 2025-02-03

## 2024-08-07 NOTE — PROGRESS NOTES
Go Milton presents today for   Chief Complaint   Patient presents with    Back Pain     Back pain a little better since last visit       Is someone accompanying this pt? Yes, Wife    Is the patient using any DME equipment during OV? Yes, Rolling walker    Coordination of Care:  1. Have you been to the ER, urgent care clinic since your last visit? no  Hospitalized since your last visit? no    2. Have you seen or consulted any other health care providers outside of the [facility] since your last visit? no Include any pap smears or colon screening. no    
reviewed the chart and agree that the record reflects my personal performance and is accurate and complete. [Electronically Signed: Zaida Clark MD. 8/7/2024 6:34 PM EDT ]    This note was created using Dragon transcription software and may contain unintended errors.

## 2024-09-11 NOTE — PROGRESS NOTES
[facility] [address]  [address], [address]  [place], [facility] [address]  Phone: [phone]  Fax: [phone]        Tj Avila  : 1955  PCP: Ted Lemus MD    PROGRESS NOTE      ASSESSMENT AND PLAN    Diagnoses and all orders for this visit:    1. Imbalance  -     REFERRAL TO NEUROLOGY    2. Lumbar facet arthropathy    3. Lumbar neuritis        1. Emily Hussein is a 61 y.o. male reporting difficulty with gait. Nerve root blocks helped his radicular pain. He has difficulty initiating ambulation and has imbalance. Prior to any RF, I would like to have him evaluated by neurology. 2. Risks, benefits, alternatives, and limitations of RFA discussed with patient. Patient will receive work up with neurology before proceeding with RF consideration. 3. Advised to continue HEP as tolerated. Follow-up and Dispositions    · Return in about 6 weeks (around 2022) for medication management. HISTORY OF PRESENT ILLNESS      Emily Hussein is a 61 y.o. male presents for follow up of back pain/RFA eval. LV 2022 with Dr. Dang Luna. He reports chronic pain since his work injury with intermittent pain radiating into his right leg. He back pain is exacerbated with standing, walking, and weather changes. Patient's leg pain with increased with sitting. He ambulates with a walking stick for support. Wife got him a walking stick 6 months ago to help him get around. He has progressive issues with his balance. Denies any upper extremity weakness. Had cervical MRI in 2019 showing minimal stenosis. Thoracic MRI was benign in 2019. Patient takes Gabapentin and Zanaflex with benefit. He states that Zanaflex promotes somnolence but helps him along with the gabapentin. Pablo Schmitz He occasionally takes Ibuprofen for bad days. He is compliant with his HEP.       Pain Assessment  3/14/2022   Location of Pain Back   Location Modifiers -   Severity of Pain 0 Please call the patient regarding her abnormal result.  The patient has diabetic retinopathy both eyes she needs to be under the care of a retina specialist make sure she does go to a retina specialist Quality of Pain Dull; Other (Comment)   Quality of Pain Comment pressure   Duration of Pain Persistent   Duration of Pain Comment -   Frequency of Pain Intermittent   Frequency of Pain Comment -   Date Pain First Started -   Date Pain First Started Comment -   Aggravating Factors Other (Comment)   Aggravating Factors Comment trying to move too fast, standing up too long or a lot   Limiting Behavior Yes   Relieving Factors Other (Comment)   Relieving Factors Comment sitting   Result of Injury No   Work-Related Injury -   How long out of work? -   Type of Injury -   Type of Injury Comment -     Onset: 2018, work injury    Investigations:   L MRI 2021: moderate FA L4-5, mild FA L5-S1, right L5-S1 HNP  C MRI 2019: Multilevel degenerative changes, mild stenosis  T MRI 2019: Benign  Spine surgery consult: none    Treatments:  Physical therapy: 1/2022 with temporary benefit  Spinal injections: 1/2022 B/L L5 ZOE with greater than 50% improvement,  unilateral lumbar RF 2019? With benefit WC injury. Spinal surgery- none  Beneficial medications: Gabapentin, Ibuprofen, Zanaflex  Failed medications: Baclofen (dizziness, sweats), Mobic     Work Status: on disability. Previously worked at [employer]. hx Worker's Comp. injury in 10/2018, treated by Dr. Rena Alegria.   Pertinent PMHx:  GERD      PHYSICAL EXAMINATION    Visit Vitals  Pulse 64   Temp 97.7 °F (36.5 °C) (Temporal)   Ht 6' (1.829 m)   Wt 215 lb (97.5 kg)   SpO2 99% Comment: RA   BMI 29.16 kg/m²       Lumbar flexion fingertips to knees, pain with posterior rotation  Unable to do tandem gait  DTRs 1+ patella, absent achilles  Negative Russo's  SLR negative  No atrophy  Positive Romberg sign              Written by Damián Cedeno, as dictated by Temi Steele MD.

## 2025-02-07 ENCOUNTER — TELEPHONE (OUTPATIENT)
Age: 70
End: 2025-02-07

## 2025-02-07 NOTE — TELEPHONE ENCOUNTER
Patient called in and would like to know if he can change his appt on Monday to a Virtual Visit. Patient stated that he is having car issues.    Please advise patient @ [phone]

## 2025-02-10 ENCOUNTER — SCHEDULED TELEPHONE ENCOUNTER (OUTPATIENT)
Age: 70
End: 2025-02-10
Payer: MEDICARE

## 2025-02-10 DIAGNOSIS — M47.816 LUMBAR FACET ARTHROPATHY: Primary | ICD-10-CM

## 2025-02-10 DIAGNOSIS — M54.16 CHRONIC RADICULAR LOW BACK PAIN: ICD-10-CM

## 2025-02-10 DIAGNOSIS — G89.29 CHRONIC RADICULAR LOW BACK PAIN: ICD-10-CM

## 2025-02-10 PROCEDURE — 1160F RVW MEDS BY RX/DR IN RCRD: CPT | Performed by: PHYSICAL MEDICINE & REHABILITATION

## 2025-02-10 PROCEDURE — 1123F ACP DISCUSS/DSCN MKR DOCD: CPT | Performed by: PHYSICAL MEDICINE & REHABILITATION

## 2025-02-10 PROCEDURE — 99214 OFFICE O/P EST MOD 30 MIN: CPT | Performed by: PHYSICAL MEDICINE & REHABILITATION

## 2025-02-10 PROCEDURE — 1159F MED LIST DOCD IN RCRD: CPT | Performed by: PHYSICAL MEDICINE & REHABILITATION

## 2025-02-10 RX ORDER — GABAPENTIN 600 MG/1
600 TABLET ORAL 3 TIMES DAILY
Qty: 270 TABLET | Refills: 1 | Status: SHIPPED | OUTPATIENT
Start: 2025-02-10 | End: 2025-08-09

## 2025-02-10 RX ORDER — TIZANIDINE 2 MG/1
2-4 TABLET ORAL 2 TIMES DAILY PRN
Qty: 120 TABLET | Refills: 2 | Status: SHIPPED | OUTPATIENT
Start: 2025-02-10

## 2025-02-10 RX ORDER — IBUPROFEN 800 MG/1
800 TABLET, FILM COATED ORAL EVERY 6 HOURS PRN
COMMUNITY
Start: 2024-12-30

## 2025-02-10 NOTE — PROGRESS NOTES
Go Milton presents today for   Chief Complaint   Patient presents with    Back Problem    Pain    Back Pain       Is someone accompanying this pt? no    Is the patient using any DME equipment during OV? no    Depression Screening:       No data to display                Learning Assessment:  Failed to redirect to the Timeline version of the [employer].    Abuse Screening:       No data to display                Fall Risk  Failed to redirect to the Timeline version of the [employer].    OPIOID RISK TOOL  Failed to redirect to the Timeline version of the [employer].    Coordination of Care:  1. Have you been to the ER, urgent care clinic since your last visit? no  Hospitalized since your last visit? no    2. Have you seen or consulted any other health care providers outside of the [facility] since your last visit? no Include any pap smears or colon screening. no

## 2025-02-10 NOTE — PROGRESS NOTES
[address] [facility], [address]  [place] [address]  Phone: [phone]  Fax: [phone]      Consent: Go Rose was evaluated through a patient-initiated, synchronous (real-time) audio only encounter. He (or guardian if applicable) is aware that it is a billable service, which includes applicable co-pays, with coverage as determined by his insurance carrier. This visit was conducted with the patient's (and/or legan guardian's) verbal consent. He has not had a related appointment within my department in the past 7 days or scheduled within the next 24 hours. The patient was located in a state where the provider was licensed to provide care.     The patient was located at Home: [facility] [place] [address]  Provider was located at Facility (Appt Dept): [address] [place] [address]  [address]  [place] [address]  Confirm you are appropriately licensed, registered, or certified to deliver care in the state where the patient is located as indicated above. If you are not or unsure, please re-schedule the visit: Yes, I confirm.     [place] has capacity for audio-visual visits through [employer]. Audio only visit today as patient is unable or unwilling to use audio-visual technology.     Go Rose is a 69 y.o. male evaluated via patient initiated telephone call on 2/10/2025.      Go was seen today for back problem, pain and back pain.    Diagnoses and all orders for this visit:    Lumbar facet arthropathy  -     tiZANidine (ZANAFLEX) 2 MG tablet; Take 1-2 tablets by mouth 2 times daily as needed (pain/stiffness)    Chronic radicular low back pain, EDx, BL  -     gabapentin (NEURONTIN) 600 MG tablet; Take 1 tablet by mouth 3 times daily for 180 days. Max Daily Amount: 1,800 mg         Go Rose is a 69 y.o. male.  With stable symptoms, manageable with current medication regimen.  Rf Gabapentin 600mg TID.  Continue to take half doses if adequate to control pain.  Generally taking routine 600 twice

## 2025-02-10 NOTE — TELEPHONE ENCOUNTER
Spoke with pt's wife and explained that we will have to watch the schedule for different times to see if we can fit them in with virtual appts. She verbalized understanding and states that they are flexible with appt dates and times.

## 2025-02-10 NOTE — TELEPHONE ENCOUNTER
Patient's wife Laxmi is requesting an update regarding her previous 2/7/2025 phone message.     Patient's wife Laxmi tel [phone]

## 2025-07-23 ENCOUNTER — OFFICE VISIT (OUTPATIENT)
Age: 70
End: 2025-07-23
Payer: MEDICARE

## 2025-07-23 VITALS
DIASTOLIC BLOOD PRESSURE: 80 MMHG | TEMPERATURE: 98.3 F | BODY MASS INDEX: 30.61 KG/M2 | SYSTOLIC BLOOD PRESSURE: 151 MMHG | WEIGHT: 226 LBS | HEIGHT: 72 IN | HEART RATE: 84 BPM | OXYGEN SATURATION: 95 %

## 2025-07-23 DIAGNOSIS — M47.816 LUMBAR FACET ARTHROPATHY: ICD-10-CM

## 2025-07-23 DIAGNOSIS — M54.16 CHRONIC RADICULAR LOW BACK PAIN: ICD-10-CM

## 2025-07-23 DIAGNOSIS — G89.29 CHRONIC RADICULAR LOW BACK PAIN: ICD-10-CM

## 2025-07-23 PROCEDURE — 1159F MED LIST DOCD IN RCRD: CPT | Performed by: NURSE PRACTITIONER

## 2025-07-23 PROCEDURE — G8427 DOCREV CUR MEDS BY ELIG CLIN: HCPCS | Performed by: NURSE PRACTITIONER

## 2025-07-23 PROCEDURE — 1125F AMNT PAIN NOTED PAIN PRSNT: CPT | Performed by: NURSE PRACTITIONER

## 2025-07-23 PROCEDURE — 3017F COLORECTAL CA SCREEN DOC REV: CPT | Performed by: NURSE PRACTITIONER

## 2025-07-23 PROCEDURE — 99213 OFFICE O/P EST LOW 20 MIN: CPT | Performed by: NURSE PRACTITIONER

## 2025-07-23 PROCEDURE — 1160F RVW MEDS BY RX/DR IN RCRD: CPT | Performed by: NURSE PRACTITIONER

## 2025-07-23 PROCEDURE — 1123F ACP DISCUSS/DSCN MKR DOCD: CPT | Performed by: NURSE PRACTITIONER

## 2025-07-23 PROCEDURE — 1036F TOBACCO NON-USER: CPT | Performed by: NURSE PRACTITIONER

## 2025-07-23 PROCEDURE — G8417 CALC BMI ABV UP PARAM F/U: HCPCS | Performed by: NURSE PRACTITIONER

## 2025-07-23 RX ORDER — GABAPENTIN 600 MG/1
600 TABLET ORAL 3 TIMES DAILY
Qty: 270 TABLET | Refills: 1 | Status: SHIPPED | OUTPATIENT
Start: 2025-07-23 | End: 2026-01-19

## 2025-07-23 RX ORDER — TIZANIDINE 2 MG/1
2-4 TABLET ORAL 2 TIMES DAILY PRN
Qty: 120 TABLET | Refills: 1 | Status: SHIPPED | OUTPATIENT
Start: 2025-07-23

## 2025-07-23 NOTE — PROGRESS NOTES
Chief complaint   Chief Complaint   Patient presents with    Back Problem     Lumbar and thoracic spine       History of Present Illness:  Go Rose is a  70 y.o.  male who comes in today after last seen Dr. Clark on February 10, 2025.  He has chronic low back pain.  He he states he will get left greater than right leg pain if he sits for a prolonged period of time such as driving on neck a long trip.  He is controlling his pain with gabapentin 600 mg 3 times a day and Zanaflex 2 mg that he takes about 2-3 times a week as needed.  He denies fever bowel bladder dysfunction.  He is retired on Social Security disability.  He denies fever bowel bladder dysfunction.      Physical Exam: The patient is a 70-year-old male well-developed well-nourished who is alert and oriented with a normal mood and affect.  He has a full weightbearing slightly antalgic gait utilizing a rolling walker.  He has 4 out of 5 strength bilateral lower extremities.  Negative straight leg raise.  He has mild pain with hyperextension lumbar spine.        Assessment and Plan: This is a patient who has chronic low back pain with intermittent radicular pain.  He has facet arthropathy.  I refilled his gabapentin and his Zanaflex.  We will see him back in 6 months sooner if needed.    Go was seen today for back problem.    Diagnoses and all orders for this visit:    Chronic radicular low back pain, EDx, BL  -     gabapentin (NEURONTIN) 600 MG tablet; Take 1 tablet by mouth 3 times daily for 180 days. Max Daily Amount: 1,800 mg    Lumbar facet arthropathy  -     tiZANidine (ZANAFLEX) 2 MG tablet; Take 1-2 tablets by mouth 2 times daily as needed (pain/stiffness)        Return in about 6 months (around 1/23/2026) for fu with NP [place].      has been .reviewed and is appropriate    Medications:  Current Outpatient Medications   Medication Sig Dispense Refill    gabapentin (NEURONTIN) 600 MG tablet Take 1 tablet by mouth 3 times daily for 180

## (undated) DEVICE — AVANOS* SHORT BEVEL NEEDLE: Brand: AVANOS

## (undated) DEVICE — (D)NDL SPNE 22GX15CM -- DISC BY MFR W/NO SUB

## (undated) DEVICE — SYR 10ML LUER LOK 1/5ML GRAD --

## (undated) DEVICE — BANDAGE ADH W0.75XL3IN UNIV WVN FAB NAT GEN USE STRP N ADH

## (undated) DEVICE — MEDIA CONTRAST 10ML 200MG/ML 41%

## (undated) DEVICE — TRAY MYEL SFTY +

## (undated) DEVICE — NEEDLE, QUINCKE, 22GX3.5": Brand: MEDLINE